# Patient Record
Sex: FEMALE | Race: WHITE | Employment: OTHER | URBAN - METROPOLITAN AREA
[De-identification: names, ages, dates, MRNs, and addresses within clinical notes are randomized per-mention and may not be internally consistent; named-entity substitution may affect disease eponyms.]

---

## 2019-04-25 LAB
ALBUMIN UR-MCNC: 30 MG/DL
APPEARANCE UR: ABNORMAL
BACTERIA #/AREA URNS HPF: ABNORMAL /HPF
BILIRUB UR QL STRIP: NEGATIVE
COLOR UR AUTO: YELLOW
GLUCOSE UR STRIP-MCNC: NEGATIVE MG/DL
HGB UR QL STRIP: ABNORMAL
KETONES UR STRIP-MCNC: NEGATIVE MG/DL
LEUKOCYTE ESTERASE UR QL STRIP: ABNORMAL
NITRATE UR QL: NEGATIVE
PH UR STRIP: 5.5 PH (ref 5–7)
RBC #/AREA URNS AUTO: 6 /HPF (ref 0–2)
SOURCE: ABNORMAL
SP GR UR STRIP: 1.01 (ref 1–1.03)
SQUAMOUS #/AREA URNS AUTO: 4 /HPF (ref 0–1)
UROBILINOGEN UR STRIP-MCNC: NORMAL MG/DL (ref 0–2)
WBC #/AREA URNS AUTO: >182 /HPF (ref 0–5)
WBC CLUMPS #/AREA URNS HPF: PRESENT /HPF

## 2019-04-25 PROCEDURE — 87088 URINE BACTERIA CULTURE: CPT | Performed by: FAMILY MEDICINE

## 2019-04-25 PROCEDURE — 81001 URINALYSIS AUTO W/SCOPE: CPT | Performed by: FAMILY MEDICINE

## 2019-04-25 PROCEDURE — 87086 URINE CULTURE/COLONY COUNT: CPT | Performed by: FAMILY MEDICINE

## 2019-04-25 PROCEDURE — 87186 SC STD MICRODIL/AGAR DIL: CPT | Performed by: FAMILY MEDICINE

## 2019-04-29 LAB
BACTERIA SPEC CULT: ABNORMAL
Lab: ABNORMAL
SPECIMEN SOURCE: ABNORMAL

## 2019-05-14 RX ORDER — LISINOPRIL 5 MG/1
5 TABLET ORAL DAILY
COMMUNITY
End: 2019-06-04

## 2019-05-14 RX ORDER — NITROGLYCERIN 0.4 MG/1
0.4 TABLET SUBLINGUAL EVERY 5 MIN PRN
COMMUNITY
End: 2019-09-06

## 2019-05-14 RX ORDER — FUROSEMIDE 20 MG
20 TABLET ORAL DAILY
COMMUNITY
End: 2019-06-04

## 2019-05-14 RX ORDER — ALLOPURINOL 300 MG/1
150 TABLET ORAL DAILY
COMMUNITY
End: 2019-06-04

## 2019-05-14 RX ORDER — PRAVASTATIN SODIUM 10 MG
10 TABLET ORAL DAILY
COMMUNITY
End: 2019-06-04

## 2019-05-14 RX ORDER — METOPROLOL SUCCINATE 25 MG/1
25 TABLET, EXTENDED RELEASE ORAL DAILY
COMMUNITY
End: 2019-06-04

## 2019-05-14 RX ORDER — CLOPIDOGREL BISULFATE 75 MG/1
75 TABLET ORAL DAILY
COMMUNITY
End: 2019-06-04

## 2019-05-14 RX ORDER — WARFARIN SODIUM 2.5 MG/1
TABLET ORAL DAILY
COMMUNITY

## 2019-05-14 RX ORDER — ACETAMINOPHEN 500 MG
1000 TABLET ORAL EVERY 6 HOURS PRN
COMMUNITY
End: 2019-07-26

## 2019-05-14 RX ORDER — DOCUSATE SODIUM 100 MG/1
100 CAPSULE, LIQUID FILLED ORAL 2 TIMES DAILY
COMMUNITY
End: 2019-06-04

## 2019-05-14 NOTE — PROGRESS NOTES
[facility]  PRIMARY CARE PROVIDER AND CLINIC:  Bridgett Delarosa, MIRZA CNP, [address] / [place] [address]  Chief Complaint   Patient presents with     Establish Care     [facility] Medical Record Number:  [id number]  Place of Service where encounter took place:  [facility] [[id number]]    Gladys Garrido  is a 89 year old  (4/24/1929), admitted to the above facility from  [facility]. Hospital stay 5/3/19 through 5/5/19..  Admitted to this facility for  rehab, medical management and nursing care.    HPI:    HPI information obtained from: facility chart records, facility staff, patient report, [facility] chart review and Care Everywhere [facility] chart review.     Brief Summary of Hospital Course:     This is a 89-year-old female, with a past medical history significant for chronic atrial fibrillation on anticoagulation, coronary artery disease s/p STEMI with aspiration thrombectomy and kissing balloon PTCA to distal RCA/RPDA on 3/21/19, takotsubo cardiomyopathy, polymyalgia rheumatic syndrome, chronic low back pain and chronic dysphagia, who was initially hospitalized at [facility] 3/21/19 through 3/27/19 for STEMI with inferior MI, RCA embolism thrombectomy and kissing balloon PTCA to RCA and RPDA on 3/21/19 . Thought to be secondary to subtherapeutic INR. An echocardiogram on 3/21/19 revealed EF of 35-40% with severe hypokinesis of the entire inferolateral wall extending to the apex and mid posterior wall. Mild-to-moderate aortic stenosis, severe TR and moderate pulmonary hypertension. Treated with diuretics for pulmonary edema and volume overload post-stenting. Metoprolol dose was decreased due to bradycardia. Noted to have possible aspiration after vomiting in cath lab. Treated with antibiotics x 24 hours as respiratory status improved with no signs of infection. Required 1:1 for metabolic encephalopathy  with visual hallucinations, confusion and agitation. Discharged to [facility].    Sent back to [facility] 4/4/19 through 4/5/19 for chest pain. Noted to have mild elevation in Troponin which may be residual from prior inferior STEMI. EF improved to 45-50%. Transferred back to [facility]. Completed Physical and Occupational Therapy. Discharged home on 4/17/19.     A urine culture was obtained on 4/25/19 with ,000 Citrobacter freundii complex. Received 1 dose of Macrobid, but then went to [facility] 5/3/19 through 5/5/19 for a fever. Diagnosed with sepsis related to a UTI, but urine culture revealed no growth. Treated with antibiotics. Discharged home on antibiotics.    Updates on Status Since Skilled nursing Admission:     Concerned about swelling in legs. Having trouble getting her shoes on. Has not taken her Lasix pill since 5/13/19 as her prescription is available at [facility], but her [place] and [place] to  the prescription. Notes that she dribbles more urine when she takes her Lasix medication. Having bowel movements with red pills she takes in yogurt. Lived at a different AL, but moved to [facility] as she was allergic to a smell. Previous PCP retired so is now having to establish care with a new provider. Pushes her wheelchair down to meals. Today would have been her 71 year anniversary with her  if he was still alive. 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 Did initially work outside of the home at [employer]. 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 Has 4 children, with a son in [place] living the furthest away. Has a daughter in [place], who travels for work, son in [place] and a son who lives up in [place]. Would prefer daughter or son in-law be called first.     Spoke to daughter, Nichole, who [place] the [place] and unable to  the  Lasix prescription for her mother. Has a meeting for work tomorrow, but could try to find someone else to  the prescription. Reports her mother has had edema in the legs for years. Improves when she is hospitalized or in a TCU and then comes back again.     CODE STATUS/ADVANCE DIRECTIVES DISCUSSION:   DNR / DNI  Patient's living condition: lives in an assisted living facility  ALLERGIES: Aspirin; Codeine; Penicillins; and Sulfa drugs  PAST MEDICAL HISTORY:  has no past medical history on file.  PAST SURGICAL HISTORY:   has no past surgical history on file.  FAMILY HISTORY: family history is not on file.  SOCIAL HISTORY:       Post Discharge Medication Reconciliation Status: discharge medications reconciled, continue medications without change    Current Outpatient Medications   Medication Sig Dispense Refill     acetaminophen (TYLENOL) 500 MG tablet Take 1,000 mg by mouth every 6 hours as needed for mild pain       allopurinol (ZYLOPRIM) 300 MG tablet Take 150 mg by mouth daily       clopidogrel (PLAVIX) 75 MG tablet Take 75 mg by mouth daily       docusate sodium (COLACE) 100 MG capsule Take 100 mg by mouth 2 times daily       furosemide (LASIX) 20 MG tablet Take 20 mg by mouth daily       lisinopril (PRINIVIL/ZESTRIL) 5 MG tablet Take 5 mg by mouth daily       metoprolol succinate ER (TOPROL-XL) 25 MG 24 hr tablet Take 25 mg by mouth daily       nitroGLYcerin (NITROSTAT) 0.4 MG sublingual tablet Place 0.4 mg under the tongue every 5 minutes as needed for chest pain For chest pain place 1 tablet under the tongue every 5 minutes for 3 doses. If symptoms persist 5 minutes after 1st dose call 911.       pravastatin (PRAVACHOL) 10 MG tablet Take 10 mg by mouth daily       warfarin (COUMADIN) 2.5 MG tablet Take by mouth daily 5 mg Tues/WedFri/Sun and 2.5 mg AOD       ROS:  10 point ROS of systems including Constitutional, Eyes, Respiratory, Cardiovascular, Gastroenterology, Genitourinary, Integumentary,  Musculoskeletal, Psychiatric were all negative except for pertinent positives noted in my HPI.    Vitals:  /57   Pulse 80   Temp 98  F (36.7  C)   Resp 16   Wt 69.9 kg (154 lb 3.2 oz)   Exam:  GENERAL APPEARANCE:  Alert, in no distress  ENT:  Mouth and posterior oropharynx normal, moist mucous membranes  EYES:  EOM, conjunctivae, lids, pupils and irises normal  RESP:  respiratory effort and palpation of chest normal, lungs clear to auscultation , no respiratory distress  CV:  Palpation and auscultation of heart done , regular rate and rhythm, no murmur, rub, or gallop  ABDOMEN:  normal bowel sounds, soft, nontender, no hepatosplenomegaly or other masses  M/S:   Active movement of bilateral upper and lower extremities. 1+ edema in BLE.  SKIN:  Inspection of skin and subcutaneous tissue baseline, Palpation of skin and subcutaneous tissue baseline  NEURO:   Cranial nerves 2-12 are normal tested and grossly at patient's baseline  PSYCH:  affect and mood normal    Lab/Diagnostic data:  Labs done in SNF are in [facility]. Please refer to them using [employer]/Care Everywhere.    ASSESSMENT/PLAN:  Coronary Artery Disease/STEMI S/P PTCA to Distal RCA/PDA/BREDNA Trifurcation/Hypertension. Last seen by Laurel Rasmussen NP, on 4/24/19. Instructed to follow-up with Dr. Emily Griggs in 3 months. Continue Clopidogrel x 1 year from heart attack. Monitor vital signs monthly. Continue Lisinopril, Metoprolol, Nitroglycerin and Pravastatin as ordered. Receiving Home Health RN through [facility]. Blood pressure low today, 106/57. Will have  Nurse follow-up with blood pressures to determine if further adjustments need to be made.    Ischemic Cardiomyopathy with EF 45-50% on 4/24/19. Has not taken Furosemide in 3 days due to being unable to get to pharmacy to  prescription. Discussed with nursing at facility who report unable to order prescription through [facility] pharmacy, [place] pharmacy, until July as prescription was  recently filled at [facility]. Home Health Nurse canceled prescription at [facility] so it was able to be filled at [facility] pharmacy. Weight 154.2 lbs today with no other recent weights available to review. Has BLE edema, but no complaints of shortness of breath at rest. Weight 145 lbs at hospital discharge 5/5/19. Continue to monitor weights. If continued upward trend, despite now receiving daily Furosemide, will need to increase Furosemide and/or update Cardiology. HH RN to be out to see patient tomorrow.    Chronic Atrial Fibrillation on Anticoagulation. Next INR due tomorrow. Takes Warfarin 5 mg on T, W, F, Sun and 2.5 mg all other days. Following today's visit, received report from nursing staff of medication error that patient received 7.5 mg daily x 2 days. Monitor for bleed closely. Remains on Metoprolol.     Chronic Bilateral Lower Extremity Edema. Takes Furosemide. Encourage elevation. May also benefit from compression stockings.    Polymyalgia Rheumatica. Previously on Prednisone, but this caused weakness and intermittent visual hallucinations, vivid dreams and intermittent indigestion. Son reported to previous provider acute PMR when sed rate 40. Will need to clarify when Prednisone was stopped. Not on most recent hospitalization admission medication list.     Chronic Low Back Pain. No reports of pain during today's visit. Has Acetaminophen available as needed.    Gout. Continue Allopurinol as ordered. Uncertain of last gout flare. Consider GDR if no recent gout flare.     Anemia. Hemoglobin 12.3 on 3/21/19 -> 10.6 on 5/4/19. Will repeat CBC to ensure stability.     Total time spent with patient visit was 60 minutes including patient visit, review of past records and phone call to patient contact. Greater than 50% of total time spent with counseling and coordinating care due to review of past medical history, review of most recent hospitalizations, discussion with patient, phone call to daughter and  discussion with staff regarding Furosemide and weight.     Electronically signed by:  MIRZA Kwan CNP

## 2019-05-15 ENCOUNTER — ASSISTED LIVING VISIT (OUTPATIENT)
Dept: GERIATRICS | Facility: CLINIC | Age: 84
End: 2019-05-15
Payer: MEDICARE

## 2019-05-15 VITALS
WEIGHT: 154.2 LBS | RESPIRATION RATE: 16 BRPM | DIASTOLIC BLOOD PRESSURE: 57 MMHG | HEART RATE: 80 BPM | TEMPERATURE: 98 F | SYSTOLIC BLOOD PRESSURE: 106 MMHG

## 2019-05-15 DIAGNOSIS — I25.5 ISCHEMIC CARDIOMYOPATHY: ICD-10-CM

## 2019-05-15 DIAGNOSIS — I25.119 CORONARY ARTERY DISEASE INVOLVING NATIVE HEART WITH ANGINA PECTORIS, UNSPECIFIED VESSEL OR LESION TYPE (H): Primary | ICD-10-CM

## 2019-05-15 DIAGNOSIS — M35.3 POLYMYALGIA RHEUMATICA (H): ICD-10-CM

## 2019-05-15 DIAGNOSIS — G89.29 CHRONIC LOW BACK PAIN, UNSPECIFIED BACK PAIN LATERALITY, WITH SCIATICA PRESENCE UNSPECIFIED: ICD-10-CM

## 2019-05-15 DIAGNOSIS — M1A.9XX0 CHRONIC GOUT WITHOUT TOPHUS, UNSPECIFIED CAUSE, UNSPECIFIED SITE: ICD-10-CM

## 2019-05-15 DIAGNOSIS — I48.20 CHRONIC ATRIAL FIBRILLATION (H): ICD-10-CM

## 2019-05-15 DIAGNOSIS — M54.5 CHRONIC LOW BACK PAIN, UNSPECIFIED BACK PAIN LATERALITY, WITH SCIATICA PRESENCE UNSPECIFIED: ICD-10-CM

## 2019-05-15 DIAGNOSIS — D64.9 ANEMIA, UNSPECIFIED TYPE: ICD-10-CM

## 2019-05-15 DIAGNOSIS — R60.0 LOCALIZED EDEMA: ICD-10-CM

## 2019-05-15 NOTE — LETTER
5/15/2019        RE: Gladys Garrido  [facility]  [address]  [facility] [address]        [facility]  PRIMARY CARE PROVIDER AND CLINIC:  MIRZA Kwan CNP, [address] / [place] [address]  Chief Complaint   Patient presents with     Establish Care     [facility] Medical Record Number:  [id number]  Place of Service where encounter took place:  [facility] [[id number]]    Gladys Garrido  is a 89 year old  (4/24/1929), admitted to the above facility from  [facility]. Hospital stay 5/3/19 through 5/5/19..  Admitted to this facility for  rehab, medical management and nursing care.    HPI:    HPI information obtained from: facility chart records, facility staff, patient report, [facility] chart review and Care Everywhere [facility] chart review.     Brief Summary of Hospital Course:     This is a 89-year-old female, with a past medical history significant for chronic atrial fibrillation on anticoagulation, coronary artery disease s/p STEMI with aspiration thrombectomy and kissing balloon PTCA to distal RCA/RPDA on 3/21/19, takotsubo cardiomyopathy, polymyalgia rheumatic syndrome, chronic low back pain and chronic dysphagia, who was initially hospitalized at [facility] 3/21/19 through 3/27/19 for STEMI with inferior MI, RCA embolism thrombectomy and kissing balloon PTCA to RCA and RPDA on 3/21/19 . Thought to be secondary to subtherapeutic INR. An echocardiogram on 3/21/19 revealed EF of 35-40% with severe hypokinesis of the entire inferolateral wall extending to the apex and mid posterior wall. Mild-to-moderate aortic stenosis, severe TR and moderate pulmonary hypertension. Treated with diuretics for pulmonary edema and volume overload post-stenting. Metoprolol dose was decreased due to bradycardia. Noted to have possible aspiration after vomiting in cath lab. Treated with antibiotics x 24 hours  as respiratory status improved with no signs of infection. Required 1:1 for metabolic encephalopathy with visual hallucinations, confusion and agitation. Discharged to [facility].    Sent back to [facility] 4/4/19 through 4/5/19 for chest pain. Noted to have mild elevation in Troponin which may be residual from prior inferior STEMI. EF improved to 45-50%. Transferred back to [facility]. Completed Physical and Occupational Therapy. Discharged home on 4/17/19.     A urine culture was obtained on 4/25/19 with ,000 Citrobacter freundii complex. Received 1 dose of Macrobid, but then went to [facility] 5/3/19 through 5/5/19 for a fever. Diagnosed with sepsis related to a UTI, but urine culture revealed no growth. Treated with antibiotics. Discharged home on antibiotics.    Updates on Status Since Skilled nursing Admission:     Concerned about swelling in legs. Having trouble getting her shoes on. Has not taken her Lasix pill since 5/13/19 as her prescription is available at [facility], but her daughter is out of town [place] the [place] and unable to  the prescription. Notes that she dribbles more urine when she takes her Lasix medication. Having bowel movements with red pills she takes in yogurt. Lived at a different AL, but moved to [facility] as she was allergic to a smell. Previous PCP retired so is now having to establish care with a new provider. Pushes her wheelchair down to meals. Today would have been her 71 year anniversary with her  if he was still alive. 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 Did initially work outside of the home at [employer]. 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 Has 4 children, with a son in [place] living the furthest away. Has a daughter in [place], who travels for work, son in [place] and a son who lives up in [place]. Would prefer daughter or son in-law be  called first.     Spoke to daughter, Nichole, who [place] and unable to  the Lasix prescription for her mother. Has a meeting for work tomorrow, but could try to find someone else to  the prescription. Reports her mother has had edema in the legs for years. Improves when she is hospitalized or in a TCU and then comes back again.     CODE STATUS/ADVANCE DIRECTIVES DISCUSSION:   DNR / DNI  Patient's living condition: lives in an assisted living facility  ALLERGIES: Aspirin; Codeine; Penicillins; and Sulfa drugs  PAST MEDICAL HISTORY:  has no past medical history on file.  PAST SURGICAL HISTORY:   has no past surgical history on file.  FAMILY HISTORY: family history is not on file.  SOCIAL HISTORY:       Post Discharge Medication Reconciliation Status: discharge medications reconciled, continue medications without change    Current Outpatient Medications   Medication Sig Dispense Refill     acetaminophen (TYLENOL) 500 MG tablet Take 1,000 mg by mouth every 6 hours as needed for mild pain       allopurinol (ZYLOPRIM) 300 MG tablet Take 150 mg by mouth daily       clopidogrel (PLAVIX) 75 MG tablet Take 75 mg by mouth daily       docusate sodium (COLACE) 100 MG capsule Take 100 mg by mouth 2 times daily       furosemide (LASIX) 20 MG tablet Take 20 mg by mouth daily       lisinopril (PRINIVIL/ZESTRIL) 5 MG tablet Take 5 mg by mouth daily       metoprolol succinate ER (TOPROL-XL) 25 MG 24 hr tablet Take 25 mg by mouth daily       nitroGLYcerin (NITROSTAT) 0.4 MG sublingual tablet Place 0.4 mg under the tongue every 5 minutes as needed for chest pain For chest pain place 1 tablet under the tongue every 5 minutes for 3 doses. If symptoms persist 5 minutes after 1st dose call 911.       pravastatin (PRAVACHOL) 10 MG tablet Take 10 mg by mouth daily       warfarin (COUMADIN) 2.5 MG tablet Take by mouth daily 5 mg Tues/WedFri/Sun and 2.5 mg AOD       ROS:  10 point ROS of systems including  Constitutional, Eyes, Respiratory, Cardiovascular, Gastroenterology, Genitourinary, Integumentary, Musculoskeletal, Psychiatric were all negative except for pertinent positives noted in my HPI.    Vitals:  /57   Pulse 80   Temp 98  F (36.7  C)   Resp 16   Wt 69.9 kg (154 lb 3.2 oz)   Exam:  GENERAL APPEARANCE:  Alert, in no distress  ENT:  Mouth and posterior oropharynx normal, moist mucous membranes  EYES:  EOM, conjunctivae, lids, pupils and irises normal  RESP:  respiratory effort and palpation of chest normal, lungs clear to auscultation , no respiratory distress  CV:  Palpation and auscultation of heart done , regular rate and rhythm, no murmur, rub, or gallop  ABDOMEN:  normal bowel sounds, soft, nontender, no hepatosplenomegaly or other masses  M/S:   Active movement of bilateral upper and lower extremities. 1+ edema in BLE.  SKIN:  Inspection of skin and subcutaneous tissue baseline, Palpation of skin and subcutaneous tissue baseline  NEURO:   Cranial nerves 2-12 are normal tested and grossly at patient's baseline  PSYCH:  affect and mood normal    Lab/Diagnostic data:  Labs done in SNF are in [facility]. Please refer to them using [employer]/Care Everywhere.    ASSESSMENT/PLAN:  Coronary Artery Disease/STEMI S/P PTCA to Distal RCA/PDA/BRENDA Trifurcation/Hypertension. Last seen by Laurel Rasmussen NP, on 4/24/19. Instructed to follow-up with Dr. Emily Griggs in 3 months. Continue Clopidogrel x 1 year from heart attack. Monitor vital signs monthly. Continue Lisinopril, Metoprolol, Nitroglycerin and Pravastatin as ordered. Receiving Home Health RN through [facility]. Blood pressure low today, 106/57. Will have  Nurse follow-up with blood pressures to determine if further adjustments need to be made.    Ischemic Cardiomyopathy with EF 45-50% on 4/24/19. Has not taken Furosemide in 3 days due to being unable to get to pharmacy to  prescription. Discussed with nursing at facility who report unable to  order prescription through [facility] pharmacy, [place] pharmacy, until July as prescription was recently filled at [facility]. Home Health Nurse canceled prescription at [facility] so it was able to be filled at [facility] pharmacy. Weight 154.2 lbs today with no other recent weights available to review. Has BLE edema, but no complaints of shortness of breath at rest. Weight 145 lbs at hospital discharge 5/5/19. Continue to monitor weights. If continued upward trend, despite now receiving daily Furosemide, will need to increase Furosemide and/or update Cardiology. HH RN to be out to see patient tomorrow.    Chronic Atrial Fibrillation on Anticoagulation. Next INR due tomorrow. Takes Warfarin 5 mg on T, W, F, Sun and 2.5 mg all other days. Following today's visit, received report from nursing staff of medication error that patient received 7.5 mg daily x 2 days. Monitor for bleed closely. Remains on Metoprolol.     Chronic Bilateral Lower Extremity Edema. Takes Furosemide. Encourage elevation. May also benefit from compression stockings.    Polymyalgia Rheumatica. Previously on Prednisone, but this caused weakness and intermittent visual hallucinations, vivid dreams and intermittent indigestion. Son reported to previous provider acute PMR when sed rate 40. Will need to clarify when Prednisone was stopped. Not on most recent hospitalization admission medication list.     Chronic Low Back Pain. No reports of pain during today's visit. Has Acetaminophen available as needed.    Gout. Continue Allopurinol as ordered. Uncertain of last gout flare. Consider GDR if no recent gout flare.     Anemia. Hemoglobin 12.3 on 3/21/19 -> 10.6 on 5/4/19. Will repeat CBC to ensure stability.     Total time spent with patient visit was 60 minutes including patient visit, review of past records and phone call to patient contact. Greater than 50% of total time spent with counseling and coordinating care due to review of past medical history,  review of most recent hospitalizations, discussion with patient, phone call to daughter and discussion with staff regarding Furosemide and weight.     Electronically signed by:  MIRZA Kwan CNP                       Sincerely,        MIRZA Kwan CNP

## 2019-05-20 ENCOUNTER — RECORDS - HEALTHEAST (OUTPATIENT)
Dept: LAB | Facility: CLINIC | Age: 84
End: 2019-05-20

## 2019-05-21 LAB — BACTERIA SPEC CULT: NO GROWTH

## 2019-05-23 ENCOUNTER — MEDICAL CORRESPONDENCE (OUTPATIENT)
Dept: HEALTH INFORMATION MANAGEMENT | Facility: CLINIC | Age: 84
End: 2019-05-23

## 2019-05-23 LAB
ANION GAP SERPL CALCULATED.3IONS-SCNC: 7 MMOL/L (ref 3–14)
BUN SERPL-MCNC: 25 MG/DL (ref 7–30)
CALCIUM SERPL-MCNC: 9.1 MG/DL (ref 8.5–10.1)
CHLORIDE SERPL-SCNC: 109 MMOL/L (ref 94–109)
CO2 SERPL-SCNC: 25 MMOL/L (ref 20–32)
CREAT SERPL-MCNC: 0.65 MG/DL (ref 0.52–1.04)
ERYTHROCYTE [DISTWIDTH] IN BLOOD BY AUTOMATED COUNT: 15.1 % (ref 10–15)
GFR SERPL CREATININE-BSD FRML MDRD: 78 ML/MIN/{1.73_M2}
GLUCOSE SERPL-MCNC: 115 MG/DL (ref 70–99)
HCT VFR BLD AUTO: 31.2 % (ref 35–47)
HGB BLD-MCNC: 10.3 G/DL (ref 11.7–15.7)
MCH RBC QN AUTO: 30.7 PG (ref 26.5–33)
MCHC RBC AUTO-ENTMCNC: 33 G/DL (ref 31.5–36.5)
MCV RBC AUTO: 93 FL (ref 78–100)
PLATELET # BLD AUTO: 214 10E9/L (ref 150–450)
POTASSIUM SERPL-SCNC: 3.3 MMOL/L (ref 3.4–5.3)
RBC # BLD AUTO: 3.36 10E12/L (ref 3.8–5.2)
SODIUM SERPL-SCNC: 141 MMOL/L (ref 133–144)
WBC # BLD AUTO: 3.5 10E9/L (ref 4–11)

## 2019-05-23 PROCEDURE — 85027 COMPLETE CBC AUTOMATED: CPT | Performed by: INTERNAL MEDICINE

## 2019-05-23 PROCEDURE — 80048 BASIC METABOLIC PNL TOTAL CA: CPT | Performed by: INTERNAL MEDICINE

## 2019-05-28 VITALS
DIASTOLIC BLOOD PRESSURE: 57 MMHG | TEMPERATURE: 98 F | WEIGHT: 147 LBS | SYSTOLIC BLOOD PRESSURE: 106 MMHG | RESPIRATION RATE: 16 BRPM | HEART RATE: 80 BPM

## 2019-05-28 NOTE — PROGRESS NOTES
[place] [facility]  [facility] Record Number:  [id number]  Place of Service where encounter took place:  [facility] [[id number]]  Chief Complaint   Patient presents with     RECHECK     HPI:    Gladys Garrido  is a 89 year old (4/24/1929), who is being seen today for an episodic care visit.  HPI information obtained from: facility chart records, facility staff, patient report, [facility] chart review and Care Everywhere [facility] chart review.    Today's concern is:    Ischemic Cardiomyopathy with EF 45-50% on 4/24/19. On 5/15/19 visit, patient had not received Furosemide for 3 days prior due to inability to obtain prescription at pharmacy. Re-initiated Furosemide 20 mg daily on 5/15/19 after it was re-ordered through [place] [facility]. On 5/20/19, [facility] RN noted patient to have increased swelling in bilateral legs as well as an increase in weight with an overall sense of not feeling well. Ordered increase in Furosemide 20 mg PO BID x 3 days. On 5/23/19, noted to be feeling better by [facility] RN. Today, patient is visibly short of breath walking to the door from her chair in the living room, but denies shortness of breath. Reports feeling dizzy upon standing up. Is concerned as she is dribbling urine. Had a more steady stream when her water pill was increased. Has also noted her legs are heavy. Denies chest pain.     Hypertension. Limited blood pressures available to review, 106/57 on 5/15/19.  CHRISTEL RN has been obtaining blood pressures at visits, but those are unable to be reviewed at this time. Today, patient complains of dizziness upon standing. Resolved when sitting down.    Polymyalgia Rheumatica. Complains of right shoulder pain. States the last time she had this pain, her PMR was flaring up. Had to be put on Prednisone. Would like her rate checked to see if she needs to be put on medication. Does not want to go out to see a Rheumatologist if  indicated. States pain is managed with Tylenol. Would like Tylenol available in her room as she likes to be independent.    Tearfulness. During today's visit, is tearful discussing how her children are all busy with their lives. States her daughter is frequently out of town for work or working on her cabin. Her physician son will be out of town until next month. Most supportive son lives in [place]. Also mentions how her  was unable to see their 70th wedding anniversary. Her  really wanted to make it to that wedding anniversary. Wants to know why she hasn't seen a doctor or why the Cardiologist cannot come to the facility. Wanted to go to a summer gathering today, but does not feel well enough to attend. Reports not sleeping well last night.    Hypokalemia. Potassium 3.3 on 5/23/19. Potassium Chloride 20 mEq ordered on 5/28/19. No reports of palpitations    Vaginal Itchiness. Complains of feeling itchy down there. Used soap to wash that she received in the hospital. Had not used that soap before except when she was in the hospital earlier this month. Also noted a bright red smear of blood while wiping 1 time. Has not had any other discharge. Feels itchiness is on the inside. Has been dribbling when she urinates. Wears a pad in her underwear.     Past Medical and Surgical History reviewed in Epic today.    MEDICATIONS:  Current Outpatient Medications   Medication Sig Dispense Refill     acetaminophen (TYLENOL) 500 MG tablet Take 1,000 mg by mouth every 6 hours as needed for mild pain       allopurinol (ZYLOPRIM) 300 MG tablet Take 150 mg by mouth daily       clopidogrel (PLAVIX) 75 MG tablet Take 75 mg by mouth daily       docusate sodium (COLACE) 100 MG capsule Take 100 mg by mouth 2 times daily       furosemide (LASIX) 20 MG tablet Take 20 mg by mouth daily       lisinopril (PRINIVIL/ZESTRIL) 5 MG tablet Take 5 mg by mouth daily       metoprolol succinate ER (TOPROL-XL) 25 MG 24 hr tablet Take 25  mg by mouth daily       nitroGLYcerin (NITROSTAT) 0.4 MG sublingual tablet Place 0.4 mg under the tongue every 5 minutes as needed for chest pain For chest pain place 1 tablet under the tongue every 5 minutes for 3 doses. If symptoms persist 5 minutes after 1st dose call 911.       pravastatin (PRAVACHOL) 10 MG tablet Take 10 mg by mouth daily       warfarin (COUMADIN) 2.5 MG tablet Take by mouth daily 5 mg Tues/WedFri/Sun and 2.5 mg AOD       REVIEW OF SYSTEMS:  4 point ROS including Respiratory, CV, GI and , other than that noted in the HPI,  is negative    Objective:  /57   Pulse 80   Temp 98  F (36.7  C)   Resp 16   Wt 66.7 kg (147 lb)   Exam:  GENERAL APPEARANCE:  Alert, in no distress  ENT:  Mouth and posterior oropharynx normal, moist mucous membranes  EYES:  EOM, conjunctivae, lids, pupils and irises normal  RESP:  respiratory effort and palpation of chest normal, lungs clear to auscultation , no respiratory distress  CV:  Palpation and auscultation of heart done , regular rate and rhythm, no murmur, rub, or gallop  ABDOMEN:  normal bowel sounds, soft, nontender, no hepatosplenomegaly or other masses  M/S:   Active movement of bilateral upper and lower extremities. 1+ edema in BLE.  SKIN: Labia majora with no obvious lesions or redness  NEURO:   Cranial nerves 2-12 are normal tested and grossly at patient's baseline  PSYCH:  Tearful at times     Labs:   Labs done in SNF are in [facility]. Please refer to them using [facility]/Care Everywhere.    ASSESSMENT/PLAN:  Ischemic Cardiomyopathy with EF 45-50% on 4/24/19. No weight available to review from today's visit. Is visibly short of breath with mobility with EDUARD KEARNEY RN reports is new. Edema appears to be at baseline. Has refused lymphedema treatment offered by EDUARD KEARNEY RN. Given shortness of breath with mobility and overall sense of not feeling well, will increase Furosemide to 20 mg BID today. EDUARD KEARNEY RN to visit patient tomorrow with update on weight and  blood pressure. Contacted Laurel Rasmussen NP at [facility] to determine if she would like to manage patient's medication adjustments as patient was not clear how she wanted things managed and did not want daughter contacted. Cardiology support staff report PCP often manage adjustments in medication as they can see patient sooner.     Hypertension. With reports of dizziness noted during today's visit. Single blood pressure reading on 5/15/19 soft. Request [facility] RN obtain blood pressure during tomorrow's visit. May require adjustment in Lisinopril and/or Metoprolol.     Polymyalgia Rheumatica. Ordered repeat Sed Rate per patient request. Encouraged to ask staff if needing Acetaminophen for pain. Previously on Prednisone, but this caused weakness and intermittent visual hallucinations, vivid dreams and intermittent indigestion. Son reported to previous provider acute PMR when sed rate 40    Tearfulness. Noted to be depressed during 18 visit with PCP related to the loss of her . As this is only 2nd interaction with patient, question if this is baseline. Will continue to monitor mood to determine if further intervention is warranted.    Hypokalemia. Likely secondary to increase in Furosemide as noted above. Supplemented with Potassium Chloride 20 mEq PO x 1 on 19. Repeat BMP on next lab day to ensure stability.     Vaginal Itching. No obvious issues noted on labia. No speculum available to examine further. Will order Fluconazole 150 mg PO x 1 today. If no relief, could consider vaginal atrophy.     Total time with an established patient visit in the assisted livin minutes including discussions about the POC and care coordination with the patient. Greater than 50% of total time spent with counseling and coordinating care due to discussion with patient, discussion with [facility] RN, calling Cardiologist and review past medical records     Electronically signed  by:  MIRZA Kwan CNP

## 2019-05-29 ENCOUNTER — ASSISTED LIVING VISIT (OUTPATIENT)
Dept: GERIATRICS | Facility: CLINIC | Age: 84
End: 2019-05-29
Payer: MEDICARE

## 2019-05-29 DIAGNOSIS — N89.8 VAGINAL ITCHING: ICD-10-CM

## 2019-05-29 DIAGNOSIS — I10 BENIGN ESSENTIAL HYPERTENSION: ICD-10-CM

## 2019-05-29 DIAGNOSIS — E87.6 HYPOKALEMIA: ICD-10-CM

## 2019-05-29 DIAGNOSIS — I25.5 ISCHEMIC CARDIOMYOPATHY: Primary | ICD-10-CM

## 2019-05-29 DIAGNOSIS — M35.3 POLYMYALGIA RHEUMATICA (H): ICD-10-CM

## 2019-05-29 DIAGNOSIS — R45.89 TEARFULNESS: ICD-10-CM

## 2019-05-29 NOTE — LETTER
5/29/2019        RE: Gladys Garrido  [facility]  [address]  [facility] [address]        [place] [facility]  [place] [facility] Record Number:  [id number]  Place of Service where encounter took place:  [facility] ([facility] [[id number]]  Chief Complaint   Patient presents with     RECHECK     HPI:    Gladys Garrido  is a 89 year old (4/24/1929), who is being seen today for an episodic care visit.  HPI information obtained from: facility chart records, facility staff, patient report, [facility] chart review and Care Everywhere [facility] chart review.    Today's concern is:    Ischemic Cardiomyopathy with EF 45-50% on 4/24/19. On 5/15/19 visit, patient had not received Furosemide for 3 days prior due to inability to obtain prescription at pharmacy. Re-initiated Furosemide 20 mg daily on 5/15/19 after it was re-ordered through [place]. On 5/20/19, [facility] RN noted patient to have increased swelling in bilateral legs as well as an increase in weight with an overall sense of not feeling well. Ordered increase in Furosemide 20 mg PO BID x 3 days. On 5/23/19, noted to be feeling better by [facility] RN. Today, patient is visibly short of breath walking to the door from her chair in the living room, but denies shortness of breath. Reports feeling dizzy upon standing up. Is concerned as she is dribbling urine. Had a more steady stream when her water pill was increased. Has also noted her legs are heavy. Denies chest pain.     Hypertension. Limited blood pressures available to review, 106/57 on 5/15/19.  CHRISTEL RN has been obtaining blood pressures at visits, but those are unable to be reviewed at this time. Today, patient complains of dizziness upon standing. Resolved when sitting down.    Polymyalgia Rheumatica. Complains of right shoulder pain. States the last time she had this pain, her PMR was flaring up. Had to be put on Prednisone. Would like her rate checked  to see if she needs to be put on medication. Does not want to go out to see a Rheumatologist if indicated. States pain is managed with Tylenol. Would like Tylenol available in her room as she likes to be independent.    Tearfulness. During today's visit, is tearful discussing how her children are all busy with their lives. States her daughter is frequently out of town for work or working on her cabin. Her physician son will be out of town until next month. Most supportive son lives in [place]. Also mentions how her  was unable to see their 70th wedding anniversary. Her  really wanted to make it to that wedding anniversary. Wants to know why she hasn't seen a doctor or why the Cardiologist cannot come to the facility. Wanted to go to a summer gathering today, but does not feel well enough to attend. Reports not sleeping well last night.    Hypokalemia. Potassium 3.3 on 5/23/19. Potassium Chloride 20 mEq ordered on 5/28/19. No reports of palpitations    Vaginal Itchiness. Complains of feeling itchy down there. Used soap to wash that she received in the hospital. Had not used that soap before except when she was in the hospital earlier this month. Also noted a bright red smear of blood while wiping 1 time. Has not had any other discharge. Feels itchiness is on the inside. Has been dribbling when she urinates. Wears a pad in her underwear.     Past Medical and Surgical History reviewed in Epic today.    MEDICATIONS:  Current Outpatient Medications   Medication Sig Dispense Refill     acetaminophen (TYLENOL) 500 MG tablet Take 1,000 mg by mouth every 6 hours as needed for mild pain       allopurinol (ZYLOPRIM) 300 MG tablet Take 150 mg by mouth daily       clopidogrel (PLAVIX) 75 MG tablet Take 75 mg by mouth daily       docusate sodium (COLACE) 100 MG capsule Take 100 mg by mouth 2 times daily       furosemide (LASIX) 20 MG tablet Take 20 mg by mouth daily       lisinopril (PRINIVIL/ZESTRIL) 5 MG tablet  Take 5 mg by mouth daily       metoprolol succinate ER (TOPROL-XL) 25 MG 24 hr tablet Take 25 mg by mouth daily       nitroGLYcerin (NITROSTAT) 0.4 MG sublingual tablet Place 0.4 mg under the tongue every 5 minutes as needed for chest pain For chest pain place 1 tablet under the tongue every 5 minutes for 3 doses. If symptoms persist 5 minutes after 1st dose call 911.       pravastatin (PRAVACHOL) 10 MG tablet Take 10 mg by mouth daily       warfarin (COUMADIN) 2.5 MG tablet Take by mouth daily 5 mg Tues/WedFri/Sun and 2.5 mg AOD       REVIEW OF SYSTEMS:  4 point ROS including Respiratory, CV, GI and , other than that noted in the HPI,  is negative    Objective:  /57   Pulse 80   Temp 98  F (36.7  C)   Resp 16   Wt 66.7 kg (147 lb)   Exam:  GENERAL APPEARANCE:  Alert, in no distress  ENT:  Mouth and posterior oropharynx normal, moist mucous membranes  EYES:  EOM, conjunctivae, lids, pupils and irises normal  RESP:  respiratory effort and palpation of chest normal, lungs clear to auscultation , no respiratory distress  CV:  Palpation and auscultation of heart done , regular rate and rhythm, no murmur, rub, or gallop  ABDOMEN:  normal bowel sounds, soft, nontender, no hepatosplenomegaly or other masses  M/S:   Active movement of bilateral upper and lower extremities. 1+ edema in BLE.  SKIN:  Labia majora with no obvious lesions or redness  NEURO:   Cranial nerves 2-12 are normal tested and grossly at patient's baseline  PSYCH:   Tearful at times     Labs:   Labs done in SNF are in [facility]. Please refer to them using [employer]/Care Everywhere.    ASSESSMENT/PLAN:  Ischemic Cardiomyopathy with EF 45-50% on 4/24/19. No weight available to review from today's visit. Is visibly short of breath with mobility with EDUARD KEARNEY RN reports is new. Edema appears to be at baseline. Has refused lymphedema treatment offered by EDUARD KEARNEY RN. Given shortness of breath with mobility and overall sense of not feeling well, will  increase Furosemide to 20 mg BID today. EDUARD KEARNEY RN to visit patient tomorrow with update on weight and blood pressure. Contacted Laurel Rasmussen NP at [facility] to determine if she would like to manage patient's medication adjustments as patient was not clear how she wanted things managed and did not want daughter contacted. Cardiology support staff report PCP often manage adjustments in medication as they can see patient sooner.     Hypertension. With reports of dizziness noted during today's visit. Single blood pressure reading on 5/15/19 soft. Request EDUARD KEARNEY RN obtain blood pressure during tomorrow's visit. May require adjustment in Lisinopril and/or Metoprolol.     Polymyalgia Rheumatica. Ordered repeat Sed Rate per patient request. Encouraged to ask staff if needing Acetaminophen for pain. Previously on Prednisone, but this caused weakness and intermittent visual hallucinations, vivid dreams and intermittent indigestion. Son reported to previous provider acute PMR when sed rate 40    Tearfulness. Noted to be depressed during 18 visit with PCP related to the loss of her . As this is only 2nd interaction with patient, question if this is baseline. Will continue to monitor mood to determine if further intervention is warranted.    Hypokalemia. Likely secondary to increase in Furosemide as noted above. Supplemented with Potassium Chloride 20 mEq PO x 1 on 19. Repeat BMP on next lab day to ensure stability.     Vaginal Itching. No obvious issues noted on labia. No speculum available to examine further. Will order Fluconazole 150 mg PO x 1 today. If no relief, could consider vaginal atrophy.     Total time with an established patient visit in the assisted livin minutes including discussions about the POC and care coordination with the patient. Greater than 50% of total time spent with counseling and coordinating care due to discussion with patient, discussion with [facility], calling Cardiologist and review past medical records     Electronically signed by:  MIRZA Kwan CNP             Sincerely,        MIRZA Kwan CNP

## 2019-05-30 LAB
ANION GAP SERPL CALCULATED.3IONS-SCNC: 10 MMOL/L (ref 3–14)
BUN SERPL-MCNC: 21 MG/DL (ref 7–30)
CALCIUM SERPL-MCNC: 8.6 MG/DL (ref 8.5–10.1)
CHLORIDE SERPL-SCNC: 111 MMOL/L (ref 94–109)
CO2 SERPL-SCNC: 16 MMOL/L (ref 20–32)
CREAT SERPL-MCNC: 0.57 MG/DL (ref 0.52–1.04)
ERYTHROCYTE [SEDIMENTATION RATE] IN BLOOD BY WESTERGREN METHOD: 61 MM/H (ref 0–30)
GFR SERPL CREATININE-BSD FRML MDRD: 82 ML/MIN/{1.73_M2}
GLUCOSE SERPL-MCNC: 99 MG/DL (ref 70–99)
IRON SATN MFR SERPL: 14 % (ref 15–46)
IRON SERPL-MCNC: 49 UG/DL (ref 35–180)
POTASSIUM SERPL-SCNC: 4.5 MMOL/L (ref 3.4–5.3)
SODIUM SERPL-SCNC: 137 MMOL/L (ref 133–144)
TIBC SERPL-MCNC: 353 UG/DL (ref 240–430)

## 2019-05-30 PROCEDURE — 83550 IRON BINDING TEST: CPT | Performed by: INTERNAL MEDICINE

## 2019-05-30 PROCEDURE — 85652 RBC SED RATE AUTOMATED: CPT | Performed by: INTERNAL MEDICINE

## 2019-05-30 PROCEDURE — 83540 ASSAY OF IRON: CPT | Performed by: INTERNAL MEDICINE

## 2019-05-30 PROCEDURE — 80048 BASIC METABOLIC PNL TOTAL CA: CPT | Performed by: INTERNAL MEDICINE

## 2019-05-31 ENCOUNTER — TELEPHONE (OUTPATIENT)
Dept: GERIATRICS | Facility: CLINIC | Age: 84
End: 2019-05-31

## 2019-05-31 DIAGNOSIS — M35.3 PMR (POLYMYALGIA RHEUMATICA) (H): Primary | ICD-10-CM

## 2019-05-31 RX ORDER — PREDNISONE 10 MG/1
10 TABLET ORAL DAILY
Start: 2019-05-31 | End: 2019-07-02

## 2019-06-01 NOTE — TELEPHONE ENCOUNTER
Reviewed lab results from 5/30/19 with Sed Rate of 61. Contacted RN at [facility] as phone number listed for patient in facility EHR is incorrect. Spoke to patient regarding lab results, as patient's son had reported to previous provider a Sed Rate > 40 indicates a flare. Today, patient reports her right and left shoulders are painful and difficult to move. Also having neck pain. Has had neck surgery in the past. Does not recall having a reaction to Prednisone in the past. Has taken for many years with improvement in PMR symptoms. Also requests to have Tylenol available in her room to take for pain.    Upon review of previous documentation, complained of right shoulder pain and increased weakness. Sed rate elevated at 56 on 4/26/18 at PCP. Re-started on Prednisone 10 mg in late July 2018. Seen by NP on 9/4/18 and complained of increased weakness with intermittent visual hallucinations, vivid dreams and intermittent indigestion. Also noted to be grieving from the loss of her  in January 2018. Prednisone was decreased to 5 mg as it was thought this may be contributing.     Per patient report, does not remember reacting to Prednisone. Would like to try using Prednisone again. Does not want family to be called. Is not interested in being seen by [facility] Alyssa RAMIREZ.     Contacted [facility] RN who reports patient has mild cognitive deficits.     New Orders:  -Prednisone 10 mg PO every day  -Ok to keep Acetaminophen in apartment.

## 2019-06-04 DIAGNOSIS — I25.10 CORONARY ARTERY DISEASE INVOLVING NATIVE HEART WITHOUT ANGINA PECTORIS, UNSPECIFIED VESSEL OR LESION TYPE: ICD-10-CM

## 2019-06-04 DIAGNOSIS — K59.00 CONSTIPATION, UNSPECIFIED CONSTIPATION TYPE: ICD-10-CM

## 2019-06-04 DIAGNOSIS — M1A.9XX0 CHRONIC GOUT WITHOUT TOPHUS, UNSPECIFIED CAUSE, UNSPECIFIED SITE: Primary | ICD-10-CM

## 2019-06-04 DIAGNOSIS — I25.5 ISCHEMIC CARDIOMYOPATHY: ICD-10-CM

## 2019-06-05 RX ORDER — ALLOPURINOL 300 MG/1
TABLET ORAL
Qty: 16 TABLET | Refills: 98 | Status: SHIPPED | OUTPATIENT
Start: 2019-06-05 | End: 2019-09-25

## 2019-06-05 RX ORDER — FUROSEMIDE 20 MG
TABLET ORAL
Qty: 31 TABLET | Refills: 98 | Status: SHIPPED | OUTPATIENT
Start: 2019-06-05

## 2019-06-05 RX ORDER — METOPROLOL SUCCINATE 25 MG/1
TABLET, EXTENDED RELEASE ORAL
Qty: 31 TABLET | Refills: 98 | Status: SHIPPED | OUTPATIENT
Start: 2019-06-05 | End: 2019-11-17

## 2019-06-05 RX ORDER — CLOPIDOGREL BISULFATE 75 MG/1
TABLET ORAL
Qty: 31 TABLET | Refills: 98 | Status: SHIPPED | OUTPATIENT
Start: 2019-06-05

## 2019-06-05 RX ORDER — PRAVASTATIN SODIUM 10 MG
TABLET ORAL
Qty: 31 TABLET | Refills: 98 | Status: SHIPPED | OUTPATIENT
Start: 2019-06-05

## 2019-06-05 RX ORDER — LISINOPRIL 5 MG/1
TABLET ORAL
Qty: 31 TABLET | Refills: 98 | Status: SHIPPED | OUTPATIENT
Start: 2019-06-05 | End: 2019-09-25

## 2019-06-05 RX ORDER — DOCUSATE SODIUM 100 MG/1
CAPSULE, LIQUID FILLED ORAL
Qty: 62 CAPSULE | Refills: 98 | Status: SHIPPED | OUTPATIENT
Start: 2019-06-05 | End: 2019-09-25

## 2019-06-06 ENCOUNTER — TRANSFERRED RECORDS (OUTPATIENT)
Dept: HEALTH INFORMATION MANAGEMENT | Facility: CLINIC | Age: 84
End: 2019-06-06

## 2019-06-06 ENCOUNTER — TELEPHONE (OUTPATIENT)
Dept: GERIATRICS | Facility: CLINIC | Age: 84
End: 2019-06-06

## 2019-06-06 DIAGNOSIS — J18.9 PNEUMONIA DUE TO INFECTIOUS ORGANISM, UNSPECIFIED LATERALITY, UNSPECIFIED PART OF LUNG: Primary | ICD-10-CM

## 2019-06-06 RX ORDER — LEVOFLOXACIN 500 MG/1
500 TABLET, FILM COATED ORAL DAILY
Qty: 5 TABLET | Refills: 0 | Status: SHIPPED | OUTPATIENT
Start: 2019-06-06 | End: 2019-06-15

## 2019-06-07 NOTE — TELEPHONE ENCOUNTER
"Received report from [facility] RN patient had missed Warfarin dose 6/3/19 through 6/5/19. INR 1.5 today. Previous Warfarin dose 5 mg on Tu, W, F, Sun and 2.5 mg on all other days. Ordered Warfarin 7.5 mg on 6/6/19 and 6/7/19 and Warfarin 5 mg on 6/8/19 and 6/9/19.     Nurse also noted crackles in lungs with labored breathing and increased respiratory rate. Ordered a chest x-ray which revealed \"1) streaky opacities consistent with bronchitis with superimposed left upper lobe infiltrate is seen\".    Contacted daughter, Rosalina, and discussed medication issues. Also noted findings on chest x-ray. Ordered Levaquin 500 mg PO every day x 5 days due to Penicillin allergy and recent use of Cefdinir for UTI with last dose ~ 5/11/19. Educated daughter on black box warnings of Levaquin including tendinitis. Daughter was in agreement with plan.     Re-ordered Warfarin 5 mg on 6/6/19 and 6/7/19 and 2.5 mg on 6/8/19 and 6/9/19. Repeat INR on 6/10/19.  "

## 2019-06-10 NOTE — PROGRESS NOTES
"[place] [facility]  [facility] Record Number:  [id number]  Place of Service where encounter took place:  [facility]) [[id number]]  Chief Complaint   Patient presents with     RECHECK     HPI:    Gladys Garrido  is a 89 year old (4/24/1929), who is being seen today for an episodic care visit.  HPI information obtained from: facility chart records, facility staff, patient report and [facility] chart review. Today's concern is:    Pneumonia. On 6/6/19, [facility] RN noted crackles in lungs with labored breathing and increased respiratory rate. Ordered a chest x-ray which revealed \"1) streaky opacities consistent with bronchitis with superimposed left upper lobe infiltrate is seen\". Started on Levofloxacin 500 mg PO daily x 5 days. Today, patient is resting in bed in the late morning. Reports some mucous in her cough. Doesn't last long. Was told by her daughter after talking her recently that she sounded better. Will be happy when she is done with the antibiotic as it is large in size. Denies shortness of breath.     Polymyalgia Rheumatica. Sed Rate 61 on 5/31/19. Complained of bilateral shoulder pain. States this is how her polymyalgia rheumatica started out the last time. Was previously on Prednisone daily. Re-started on Prednisone 10 mg. Today, patient reports she continues to have pain in the shoulders, but it has improved elsewhere. Feels it is bone on bone. Improved with Tylenol.     Carpal Tunnel Syndrome on Left. Sometimes has numbness in the left hand. Happens when using the hand. Improved when moving the hand. Used a glove in the past when experiencing these symptoms. Does not have the glove at her current apartment.     Iron Deficiency Anemia. Last Hemoglobin 10.3 on 5/23/19. Iron Saturation Index 14.    Tearfulness. During today's visit, patient is tearful. States she doesn't like having so many visitors at her apartment. Feels she does " fine in her day to day activities. Only becomes tearful when people come to visit and make her think. Talks about losing her . Discusses her 4 children who are busy with their own lives. Laying in bed in the late morning during today's visit and states this is because she didn't sleep well last night. Had to get up to use the bathroom. Doesn't always like going to activities at the [place] as cliques have formed.     Past Medical and Surgical History reviewed in Epic today.    MEDICATIONS:  Current Outpatient Medications   Medication Sig Dispense Refill     acetaminophen (TYLENOL) 500 MG tablet Take 1,000 mg by mouth every 6 hours as needed for mild pain       allopurinol (ZYLOPRIM) 300 MG tablet TAKE ONE-HALF TABLET (150MG) BY MOUTH ONCE DAILY 16 tablet 98     clopidogrel (PLAVIX) 75 MG tablet TAKE 1 TABLET BY MOUTH ONCE DAILY 31 tablet 98     docusate sodium (COLACE) 100 MG capsule TAKE 1 CAPSULE BY MOUTH TWICE DAILY 62 capsule 98     furosemide (LASIX) 20 MG tablet TAKE 1 TABLET BY MOUTH ONCE DAILY 31 tablet 98     levofloxacin (LEVAQUIN) 500 MG tablet Take 1 tablet (500 mg) by mouth daily 5 tablet 0     lisinopril (PRINIVIL/ZESTRIL) 5 MG tablet TAKE 1 TABLET BY MOUTH ONCE DAILY 31 tablet 98     metoprolol succinate ER (TOPROL-XL) 25 MG 24 hr tablet TAKE 1 TABLET BY MOUTH ONCE DAILY 31 tablet 98     nitroGLYcerin (NITROSTAT) 0.4 MG sublingual tablet Place 0.4 mg under the tongue every 5 minutes as needed for chest pain For chest pain place 1 tablet under the tongue every 5 minutes for 3 doses. If symptoms persist 5 minutes after 1st dose call 911.       pravastatin (PRAVACHOL) 10 MG tablet TAKE 1 TABLET BY MOUTH ONCE DAILY 31 tablet 98     predniSONE (DELTASONE) 10 MG tablet Take 1 tablet (10 mg) by mouth daily       warfarin (COUMADIN) 2.5 MG tablet Take by mouth daily 5 mg Tues/WedFri/Sun and 2.5 mg AOD       REVIEW OF SYSTEMS:  4 point ROS including Respiratory, CV, GI and , other than that noted in the  HPI,  is negative    Objective:  /72   Pulse 74   Temp 97.9  F (36.6  C)   Resp 19   Wt 67.9 kg (149 lb 9.6 oz)   Exam:  GENERAL APPEARANCE:  Alert, in no distress  ENT:  Mouth and posterior oropharynx normal, moist mucous membranes  EYES:  EOM, conjunctivae, lids, pupils and irises normal  RESP:  respiratory effort and palpation of chest normal, lungs clear to auscultation , no respiratory distress  CV:  Palpation and auscultation of heart done , regular rate and rhythm, no murmur, rub, or gallop  ABDOMEN:  normal bowel sounds, soft, nontender, no hepatosplenomegaly or other masses  M/S:   Active movement of bilateral upper and lower extremities. 1+ edema in BLE.  SKIN: Labia majora with no obvious lesions or redness  NEURO:   Cranial nerves 2-12 are normal tested and grossly at patient's baseline  PSYCH:  Tearful at times    Labs:   Labs done in SNF are in [place] EPIC. Please refer to them using [employer]/Care Everywhere.    ASSESSMENT/PLAN:  Pneumonia. Lung sounds clear. VSS. Continue course of Levofloxacin on 6/11/19.     Polymyalgia Rheumatica. Re-initiated on Prednisone 10 mg on 6/4/19. Since that time, patient notes improvement in her pain except in her bilateral shoulders. Improved with Acetaminophen. Questions if shoulder pain is related to PMR. Recommended patient be evaluated by [facility]. Patient states she does not want anymore people coming to her apartment. Asked if [facility] PA-C could call her on the phone, but patient declined. Continue Prednisone and Acetaminophen as ordered for now. Will re-approach Ortho evaluation in the future.    Iron Deficiency Anemia. Discussed initiating Iron supplement which patient initially agreed to. Reviewed side effects of medication including constipation. Patient reports she has issues with constipation and does not want to take anything that might make it worse. Refuses to try medication. Educated on risks versus benefits for taking medication. Patient  refuses Iron supplement at this time.     Carpal Tunnel Syndrome. With numbness noted in left hand. Chronic. Improved with splint. Patient reports she will have daughter bring down her splint the next time she goes to the cabin.    Tearfulness. Also noted during 5/29/19 visit. Patient does not feel she needs a medication to help with mood. Does not want children updated from today's visit. Does not want increased services in the home as she already has enough visitors. Resistant to medication changes as noted above. [facility] RN aware of patient's increased tearfulness. Will arrange a  follow-up and care conference.     Electronically signed by:  MIRZA Kwan CNP

## 2019-06-11 VITALS
RESPIRATION RATE: 19 BRPM | SYSTOLIC BLOOD PRESSURE: 117 MMHG | TEMPERATURE: 97.9 F | WEIGHT: 149.6 LBS | DIASTOLIC BLOOD PRESSURE: 72 MMHG | HEART RATE: 74 BPM

## 2019-06-12 ENCOUNTER — ASSISTED LIVING VISIT (OUTPATIENT)
Dept: GERIATRICS | Facility: CLINIC | Age: 84
End: 2019-06-12
Payer: MEDICARE

## 2019-06-12 DIAGNOSIS — G56.02 CARPAL TUNNEL SYNDROME ON LEFT: ICD-10-CM

## 2019-06-12 DIAGNOSIS — D50.9 IRON DEFICIENCY ANEMIA, UNSPECIFIED IRON DEFICIENCY ANEMIA TYPE: ICD-10-CM

## 2019-06-12 DIAGNOSIS — M35.3 POLYMYALGIA RHEUMATICA (H): ICD-10-CM

## 2019-06-12 DIAGNOSIS — J18.9 PNEUMONIA DUE TO INFECTIOUS ORGANISM, UNSPECIFIED LATERALITY, UNSPECIFIED PART OF LUNG: Primary | ICD-10-CM

## 2019-06-12 NOTE — LETTER
"    6/12/2019        RE: Gladys Garrido  [facility]  [address]  [facility] [address]        [facility]  [facility] Record Number:  [id number]  Place of Service where encounter took place:  [facility]) [[id number]]  Chief Complaint   Patient presents with     RECHECK     HPI:    Gladys Garrido  is a 89 year old (4/24/1929), who is being seen today for an episodic care visit.  HPI information obtained from: facility chart records, facility staff, patient report and [facility] chart review. Today's concern is:    Pneumonia. On 6/6/19, [facility] RN noted crackles in lungs with labored breathing and increased respiratory rate. Ordered a chest x-ray which revealed \"1) streaky opacities consistent with bronchitis with superimposed left upper lobe infiltrate is seen\". Started on Levofloxacin 500 mg PO daily x 5 days. Today, patient is resting in bed in the late morning. Reports some mucous in her cough. Doesn't last long. Was told by her daughter after talking her recently that she sounded better. Will be happy when she is done with the antibiotic as it is large in size. Denies shortness of breath.     Polymyalgia Rheumatica. Sed Rate 61 on 5/31/19. Complained of bilateral shoulder pain. States this is how her polymyalgia rheumatica started out the last time. Was previously on Prednisone daily. Re-started on Prednisone 10 mg. Today, patient reports she continues to have pain in the shoulders, but it has improved elsewhere. Feels it is bone on bone. Improved with Tylenol.     Carpal Tunnel Syndrome on Left. Sometimes has numbness in the left hand. Happens when using the hand. Improved when moving the hand. Used a glove in the past when experiencing these symptoms. Does not have the glove at her current apartment.     Iron Deficiency Anemia. Last Hemoglobin 10.3 on 5/23/19. Iron Saturation Index 14.    Tearfulness. During today's visit, " patient is tearful. States she doesn't like having so many visitors at her apartment. Feels she does fine in her day to day activities. Only becomes tearful when people come to visit and make her think. Talks about losing her . Discusses her 4 children who are busy with their own lives. Laying in bed in the late morning during today's visit and states this is because she didn't sleep well last night. Had to get up to use the bathroom. Doesn't always like going to activities at the AL as cliques have formed.     Past Medical and Surgical History reviewed in Epic today.    MEDICATIONS:  Current Outpatient Medications   Medication Sig Dispense Refill     acetaminophen (TYLENOL) 500 MG tablet Take 1,000 mg by mouth every 6 hours as needed for mild pain       allopurinol (ZYLOPRIM) 300 MG tablet TAKE ONE-HALF TABLET (150MG) BY MOUTH ONCE DAILY 16 tablet 98     clopidogrel (PLAVIX) 75 MG tablet TAKE 1 TABLET BY MOUTH ONCE DAILY 31 tablet 98     docusate sodium (COLACE) 100 MG capsule TAKE 1 CAPSULE BY MOUTH TWICE DAILY 62 capsule 98     furosemide (LASIX) 20 MG tablet TAKE 1 TABLET BY MOUTH ONCE DAILY 31 tablet 98     levofloxacin (LEVAQUIN) 500 MG tablet Take 1 tablet (500 mg) by mouth daily 5 tablet 0     lisinopril (PRINIVIL/ZESTRIL) 5 MG tablet TAKE 1 TABLET BY MOUTH ONCE DAILY 31 tablet 98     metoprolol succinate ER (TOPROL-XL) 25 MG 24 hr tablet TAKE 1 TABLET BY MOUTH ONCE DAILY 31 tablet 98     nitroGLYcerin (NITROSTAT) 0.4 MG sublingual tablet Place 0.4 mg under the tongue every 5 minutes as needed for chest pain For chest pain place 1 tablet under the tongue every 5 minutes for 3 doses. If symptoms persist 5 minutes after 1st dose call 911.       pravastatin (PRAVACHOL) 10 MG tablet TAKE 1 TABLET BY MOUTH ONCE DAILY 31 tablet 98     predniSONE (DELTASONE) 10 MG tablet Take 1 tablet (10 mg) by mouth daily       warfarin (COUMADIN) 2.5 MG tablet Take by mouth daily 5 mg Tues/WedFri/Sun and 2.5 mg AOD        REVIEW OF SYSTEMS:  4 point ROS including Respiratory, CV, GI and , other than that noted in the HPI,  is negative    Objective:  /72   Pulse 74   Temp 97.9  F (36.6  C)   Resp 19   Wt 67.9 kg (149 lb 9.6 oz)   Exam:  GENERAL APPEARANCE:  Alert, in no distress  ENT:  Mouth and posterior oropharynx normal, moist mucous membranes  EYES:  EOM, conjunctivae, lids, pupils and irises normal  RESP:  respiratory effort and palpation of chest normal, lungs clear to auscultation , no respiratory distress  CV:  Palpation and auscultation of heart done , regular rate and rhythm, no murmur, rub, or gallop  ABDOMEN:  normal bowel sounds, soft, nontender, no hepatosplenomegaly or other masses  M/S:   Active movement of bilateral upper and lower extremities. 1+ edema in BLE.  SKIN: Labia majora with no obvious lesions or redness  NEURO:   Cranial nerves 2-12 are normal tested and grossly at patient's baseline  PSYCH:  Tearful at times    Labs:   Labs done in SNF are in [facility]. Please refer to them using [employer]/Care Everywhere.    ASSESSMENT/PLAN:  Pneumonia. Lung sounds clear. VSS. Continue course of Levofloxacin on 6/11/19.     Polymyalgia Rheumatica. Re-initiated on Prednisone 10 mg on 6/4/19. Since that time, patient notes improvement in her pain except in her bilateral shoulders. Improved with Acetaminophen. Questions if shoulder pain is related to PMR. Recommended patient be evaluated by [facility]. Patient states she does not want anymore people coming to her apartment. Asked if [facility] PA-C could call her on the phone, but patient declined. Continue Prednisone and Acetaminophen as ordered for now. Will re-approach Ortho evaluation in the future.    Iron Deficiency Anemia. Discussed initiating Iron supplement which patient initially agreed to. Reviewed side effects of medication including constipation. Patient reports she has issues with constipation and does not want to take anything that might  make it worse. Refuses to try medication. Educated on risks versus benefits for taking medication. Patient refuses Iron supplement at this time.     Carpal Tunnel Syndrome. With numbness noted in left hand. Chronic. Improved with splint. Patient reports she will have daughter bring down her splint the next time she goes to the cabin.    Tearfulness. Also noted during 5/29/19 visit. Patient does not feel she needs a medication to help with mood. Does not want children updated from today's visit. Does not want increased services in the home as she already has enough visitors. Resistant to medication changes as noted above. [facility] RN aware of patient's increased tearfulness. Will arrange a SW follow-up and care conference.     Electronically signed by:  MIRZA Kwan CNP             Sincerely,        MIRZA Kwan CNP

## 2019-06-20 ENCOUNTER — RECORDS - HEALTHEAST (OUTPATIENT)
Dept: LAB | Facility: CLINIC | Age: 84
End: 2019-06-20

## 2019-06-21 LAB — INR PPP: 1.91 (ref 0.9–1.1)

## 2019-07-02 DIAGNOSIS — M35.3 PMR (POLYMYALGIA RHEUMATICA) (H): ICD-10-CM

## 2019-07-03 RX ORDER — PREDNISONE 10 MG/1
TABLET ORAL
Qty: 31 TABLET | Refills: 98 | Status: SHIPPED | OUTPATIENT
Start: 2019-07-03

## 2019-07-20 NOTE — PROGRESS NOTES
[facility]  [facility] Record Number:  [id number]  Place of Service where encounter took place:  [facility] [[id number]]  Chief Complaint   Patient presents with     RECHECK     HPI:    Gladys Garrido  is a 89 year old (4/24/1929), who is being seen today for an episodic care visit.  HPI information obtained from: facility chart records, facility staff, patient report and [facility] chart review. Today's concern is:    Chronic Right Shoulder Pain. Complains of right shoulder pain. Unable to raise shoulder above head. States it is bone on bone. Describes diagnosis of PMR based on right shoulder pain and being started on Prednisone by previous primary care provider in [place]. Upon review of documentation, has not utilized Acetaminophen since May 2019.     Tearfulness. Is waiting to meet with  to discuss concerns at facility. Reports medications were not given to her and this resulted in a hospital stay. Concerned doctor at hospital thought it was her fault medications weren't taken. Is planning to go to her cabin on Friday. Asked staff for medications to take with her and has yet to hear back if she can take them. Enjoys going to the cabin as she remembers coming up with plans for the cabin with her . Feels closer to him when at the cabin. Doesn't feel like people listen to her like they did at her clinic in [place].Doesn't want her daughter to be bothered as she travels for work. Requests writer leave.    Coronary Artery Disease/STEMI S/P PTCA to Distal RCA/PDA/BRENDA Trifurcation/Hypertension. Seen by Cardiology on 7/8/19. Recommend echocardiogram in 3 months and follow-up in 6 months. Carotid bruit on right. Carotid ultrasound was to be arranged. No changes to medications made. Has had 2 blood pressures in the past month, 105/61 and 109/63.    Chronic Atrial Fibrillation on Anticoagulation. Questions why INR is at 2. Feels it  should be higher as an INR in the 1s puts her a higher risk for a stroke. Used to be at 2.5 faithfully. Avoids all greens except peas as she does not want this to affect her INR. Last INR 2.0 on 7/17/19. Continues on Warfarin 2.5 mg on Tuesday, Thursday, Friday, Sun and 5 mg on Monday, Wednesday, Saturday. Previous INR 2.0 on 7/3/19.  Heart rate 61-74 this month.     Past Medical and Surgical History reviewed in Epic today.    MEDICATIONS:  Current Outpatient Medications   Medication Sig Dispense Refill     acetaminophen (TYLENOL) 500 MG tablet Take 1,000 mg by mouth every 6 hours as needed for mild pain       allopurinol (ZYLOPRIM) 300 MG tablet TAKE ONE-HALF TABLET (150MG) BY MOUTH ONCE DAILY 16 tablet 98     clopidogrel (PLAVIX) 75 MG tablet TAKE 1 TABLET BY MOUTH ONCE DAILY 31 tablet 98     docusate sodium (COLACE) 100 MG capsule TAKE 1 CAPSULE BY MOUTH TWICE DAILY 62 capsule 98     furosemide (LASIX) 20 MG tablet TAKE 1 TABLET BY MOUTH ONCE DAILY 31 tablet 98     lisinopril (PRINIVIL/ZESTRIL) 5 MG tablet TAKE 1 TABLET BY MOUTH ONCE DAILY 31 tablet 98     metoprolol succinate ER (TOPROL-XL) 25 MG 24 hr tablet TAKE 1 TABLET BY MOUTH ONCE DAILY 31 tablet 98     nitroGLYcerin (NITROSTAT) 0.4 MG sublingual tablet Place 0.4 mg under the tongue every 5 minutes as needed for chest pain For chest pain place 1 tablet under the tongue every 5 minutes for 3 doses. If symptoms persist 5 minutes after 1st dose call 911.       pravastatin (PRAVACHOL) 10 MG tablet TAKE 1 TABLET BY MOUTH ONCE DAILY 31 tablet 98     predniSONE (DELTASONE) 10 MG tablet TAKE 1 TABLET BY MOUTH ONCE DAILY 31 tablet 98     warfarin (COUMADIN) 2.5 MG tablet Take by mouth daily 5 mg MON/WED/SAT and 2.5 mg AOD       REVIEW OF SYSTEMS:  4 point ROS including Respiratory, CV, GI and , other than that noted in the HPI,  is negative    Objective:  /61   Pulse 61   Temp 97.8  F (36.6  C)   Resp 20   Wt 66 kg (145 lb 9.6 oz)   Exam:  Refuses  Physical Exam    Labs:   Labs done in SNF are in [facility]. Please refer to them using EPIC/Care Everywhere.    ASSESSMENT/PLAN:  Chronic Right Shoulder Pain. Question if this is related to PMR as only 1 joint is involved. Recommended patient be seen by [facility] for further evaluation and pain management. Patient initially refuses stating she is going up to her cabin on Friday. When explained Ortho PA-C could see her after she returned from her trip, still refused. Taking Prednisone 10 mg for PMR. Will discuss at next visit plan for steroid taper as this was initiated 6/4/19. Acetaminophen available as needed.     Tearfulness. Present during the past 3 visits with patient. Spoke to daughter, Rosalina, via telephone from 253 pm to 317 pm. Discussed patient's tearfulness and refusal to agree to medications, consults, etc. Daughter reports this is baseline for patient. Patient is also frustrated with inability to live at cabin during the summer as she did last summer as well as  passing away 1.5 years ago. Daughter reports patient had previously agreed during care conference that daughter could be contacted by medical professionals. Daughter will talk to siblings to decide next steps.     Coronary Artery Disease/STEMI S/P PTCA to Distal RCA/PDA/BRENDA Trifurcation/Hypertension. Seen by Cardiology on 7/8/19. Recommend echocardiogram in 3 months and follow-up in 6 months. Carotid bruit on right. Carotid ultrasound was to be arranged. Monitor blood pressure monthly. Continue Clopidogrel x 1 year around March 2020, Lisinopril and Metoprolol as ordered. Also on Furosemide.     Chronic Atrial Fibrillation on Anticoagulation. Educated patient on eating green leafy vegetables in moderation as well as goal INR of 2-3.  Continue Warfarin 2.5 mg on Tuesday, Thursday, Friday, Sun and 5 mg on Monday, Wednesday, Saturday as ordered. Repeat INR on 7/31/19. Taking Metoprolol.      Electronically signed by:  Bridgett Don  MIRZA Delarosa CNP

## 2019-07-24 ENCOUNTER — ASSISTED LIVING VISIT (OUTPATIENT)
Dept: GERIATRICS | Facility: CLINIC | Age: 84
End: 2019-07-24
Payer: MEDICARE

## 2019-07-24 VITALS
HEART RATE: 61 BPM | TEMPERATURE: 97.8 F | WEIGHT: 145.6 LBS | RESPIRATION RATE: 20 BRPM | SYSTOLIC BLOOD PRESSURE: 105 MMHG | DIASTOLIC BLOOD PRESSURE: 61 MMHG

## 2019-07-24 DIAGNOSIS — M25.511 CHRONIC RIGHT SHOULDER PAIN: Primary | ICD-10-CM

## 2019-07-24 DIAGNOSIS — I48.20 CHRONIC ATRIAL FIBRILLATION (H): ICD-10-CM

## 2019-07-24 DIAGNOSIS — R45.89 TEARFULNESS: ICD-10-CM

## 2019-07-24 DIAGNOSIS — G89.29 CHRONIC RIGHT SHOULDER PAIN: Primary | ICD-10-CM

## 2019-07-24 DIAGNOSIS — I25.10 CORONARY ARTERY DISEASE INVOLVING NATIVE HEART WITHOUT ANGINA PECTORIS, UNSPECIFIED VESSEL OR LESION TYPE: ICD-10-CM

## 2019-07-24 NOTE — LETTER
7/24/2019        RE: Gladys Garrido  [facility]  [address]  [facility] [address]        [place] [facility]  [place] Medical Record Number:  [id number]  Place of Service where encounter took place:  [facility] ([facility]) [[id number]]  Chief Complaint   Patient presents with     RECHECK     HPI:    Gladys Garrido  is a 89 year old (4/24/1929), who is being seen today for an episodic care visit.  HPI information obtained from: facility chart records, facility staff, patient report and [facility] chart review. Today's concern is:    Chronic Right Shoulder Pain. Complains of right shoulder pain. Unable to raise shoulder above head. States it is bone on bone. Describes diagnosis of PMR based on right shoulder pain and being started on Prednisone by previous primary care provider in [place]. Upon review of documentation, has not utilized Acetaminophen since May 2019.     Tearfulness. Is waiting to meet with  to discuss concerns at facility. Reports medications were not given to her and this resulted in a hospital stay. Concerned doctor at hospital thought it was her fault medications weren't taken. Is planning to go to her cabin on Friday. Asked staff for medications to take with her and has yet to hear back if she can take them. Enjoys going to the cabin as she remembers coming up with plans for the cabin with her . Feels closer to him when at the cabin. Doesn't feel like people listen to her like they did at her clinic in [place].Doesn't want her daughter to be bothered as she travels for work. Requests writer leave.    Coronary Artery Disease/STEMI S/P PTCA to Distal RCA/PDA/BRENDA Trifurcation/Hypertension. Seen by Cardiology on 7/8/19. Recommend echocardiogram in 3 months and follow-up in 6 months. Carotid bruit on right. Carotid ultrasound was to be arranged. No changes to medications made. Has had 2 blood pressures in the past month,  105/61 and 109/63.    Chronic Atrial Fibrillation on Anticoagulation. Questions why INR is at 2. Feels it should be higher as an INR in the 1s puts her a higher risk for a stroke. Used to be at 2.5 faithfully. Avoids all greens except peas as she does not want this to affect her INR. Last INR 2.0 on 7/17/19. Continues on Warfarin 2.5 mg on Tuesday, Thursday, Friday, Sun and 5 mg on Monday, Wednesday, Saturday. Previous INR 2.0 on 7/3/19.  Heart rate 61-74 this month.     Past Medical and Surgical History reviewed in Epic today.    MEDICATIONS:  Current Outpatient Medications   Medication Sig Dispense Refill     acetaminophen (TYLENOL) 500 MG tablet Take 1,000 mg by mouth every 6 hours as needed for mild pain       allopurinol (ZYLOPRIM) 300 MG tablet TAKE ONE-HALF TABLET (150MG) BY MOUTH ONCE DAILY 16 tablet 98     clopidogrel (PLAVIX) 75 MG tablet TAKE 1 TABLET BY MOUTH ONCE DAILY 31 tablet 98     docusate sodium (COLACE) 100 MG capsule TAKE 1 CAPSULE BY MOUTH TWICE DAILY 62 capsule 98     furosemide (LASIX) 20 MG tablet TAKE 1 TABLET BY MOUTH ONCE DAILY 31 tablet 98     lisinopril (PRINIVIL/ZESTRIL) 5 MG tablet TAKE 1 TABLET BY MOUTH ONCE DAILY 31 tablet 98     metoprolol succinate ER (TOPROL-XL) 25 MG 24 hr tablet TAKE 1 TABLET BY MOUTH ONCE DAILY 31 tablet 98     nitroGLYcerin (NITROSTAT) 0.4 MG sublingual tablet Place 0.4 mg under the tongue every 5 minutes as needed for chest pain For chest pain place 1 tablet under the tongue every 5 minutes for 3 doses. If symptoms persist 5 minutes after 1st dose call 911.       pravastatin (PRAVACHOL) 10 MG tablet TAKE 1 TABLET BY MOUTH ONCE DAILY 31 tablet 98     predniSONE (DELTASONE) 10 MG tablet TAKE 1 TABLET BY MOUTH ONCE DAILY 31 tablet 98     warfarin (COUMADIN) 2.5 MG tablet Take by mouth daily 5 mg MON/WED/SAT and 2.5 mg AOD       REVIEW OF SYSTEMS:  4 point ROS including Respiratory, CV, GI and , other than that noted in the HPI,  is negative    Objective:  BP  105/61   Pulse 61   Temp 97.8  F (36.6  C)   Resp 20   Wt 66 kg (145 lb 9.6 oz)   Exam:  Refuses Physical Exam    Labs:   Labs done in SNF are in [facility]. Please refer to them using [facility]Care Everywhere.    ASSESSMENT/PLAN:  Chronic Right Shoulder Pain. Question if this is related to PMR as only 1 joint is involved. Recommended patient be seen by [facility] Alyssa RAMIREZ for further evaluation and pain management. Patient initially refuses stating she is going up to her cabin on Friday. When explained Ortho PA-C could see her after she returned from her trip, still refused. Taking Prednisone 10 mg for PMR. Will discuss at next visit plan for steroid taper as this was initiated 6/4/19. Acetaminophen available as needed.     Tearfulness. Present during the past 3 visits with patient. Spoke to daughter, Rosalina, via telephone from 253 pm to 317 pm. Discussed patient's tearfulness and refusal to agree to medications, consults, etc. Daughter reports this is baseline for patient. Patient is also frustrated with inability to live at cabin during the summer as she did last summer as well as  passing away 1.5 years ago. Daughter reports patient had previously agreed during care conference that daughter could be contacted by medical professionals. Daughter will talk to siblings to decide next steps.     Coronary Artery Disease/STEMI S/P PTCA to Distal RCA/PDA/BRENDA Trifurcation/Hypertension. Seen by Cardiology on 7/8/19. Recommend echocardiogram in 3 months and follow-up in 6 months. Carotid bruit on right. Carotid ultrasound was to be arranged. Monitor blood pressure monthly. Continue Clopidogrel x 1 year around March 2020, Lisinopril and Metoprolol as ordered. Also on Furosemide.     Chronic Atrial Fibrillation on Anticoagulation. Educated patient on eating green leafy vegetables in moderation as well as goal INR of 2-3.  Continue Warfarin 2.5 mg on Tuesday, Thursday, Friday, Sun and 5 mg on Monday, Wednesday, Saturday as  ordered. Repeat INR on 7/31/19. Taking Metoprolol.      Electronically signed by:  MIRZA Kwan CNP            Sincerely,        MIRZA Kwan CNP

## 2019-07-31 ENCOUNTER — RECORDS - HEALTHEAST (OUTPATIENT)
Dept: LAB | Facility: CLINIC | Age: 84
End: 2019-07-31

## 2019-08-01 LAB — INR PPP: 1.74 (ref 0.9–1.1)

## 2019-08-03 ENCOUNTER — RECORDS - HEALTHEAST (OUTPATIENT)
Dept: LAB | Facility: CLINIC | Age: 84
End: 2019-08-03

## 2019-08-05 LAB — INR PPP: 2.08 (ref 0.9–1.1)

## 2019-08-06 VITALS
TEMPERATURE: 96.7 F | SYSTOLIC BLOOD PRESSURE: 104 MMHG | RESPIRATION RATE: 16 BRPM | WEIGHT: 147.8 LBS | HEART RATE: 61 BPM | DIASTOLIC BLOOD PRESSURE: 60 MMHG

## 2019-08-07 ENCOUNTER — ASSISTED LIVING VISIT (OUTPATIENT)
Dept: GERIATRICS | Facility: CLINIC | Age: 84
End: 2019-08-07
Payer: MEDICARE

## 2019-08-07 DIAGNOSIS — I25.5 ISCHEMIC CARDIOMYOPATHY: ICD-10-CM

## 2019-08-07 DIAGNOSIS — G89.29 CHRONIC RIGHT SHOULDER PAIN: ICD-10-CM

## 2019-08-07 DIAGNOSIS — M25.511 CHRONIC RIGHT SHOULDER PAIN: ICD-10-CM

## 2019-08-07 DIAGNOSIS — M35.3 PMR (POLYMYALGIA RHEUMATICA) (H): Primary | ICD-10-CM

## 2019-08-07 NOTE — LETTER
8/7/2019        RE: Gladys Garrido  [facility]  [address]  [facility] [address]        [place] [facility]  [place] Medical Record Number:  [id number]  Place of Service where encounter took place:  [facility] ([facility]) [[id number]]  Chief Complaint   Patient presents with     RECHECK     HPI:    Gladys Garrido  is a 89 year old (4/24/1929), who is being seen today for an episodic care visit.  HPI information obtained from: facility chart records, facility staff, patient report, [facility] chart review, Care Everywhere [facility] chart review and family/first contact son, Ceferino, report. Today's concern is:    Polymyalgia Rheumatica. Complains of shoulder pain. Feels it's related to her polymyalgia rheumatica. Believes the Prednisone might be making some difference. Does not want dosage adjusted.     Chronic Right Shoulder Pain. Complains of right shoulder pain. States she can't raise it above her head. Takes Tylenol for the pain. Feels it's related to her polymyalgia rheumatica. Upon review of documentation, Acetaminophen has not been administered by staff, but patient is able to leave bottle in her room and take as needed.    Ischemic Cardiomyopathy with EF 45-50% on 4/24/19. Denies shortness of breath. Upon review of weights, 154.2 lbs on 5/15/19 -> 147.8 lbs on 7/24/19. Discharge hospital weight 145 lbs on 5/5/19 and 3/26/19     Past Medical and Surgical History reviewed in Epic today.    MEDICATIONS:  Current Outpatient Medications   Medication Sig Dispense Refill     acetaminophen (ACETAMINOPHEN EXTRA STRENGTH) 500 MG tablet Take 2 tablets (1,000 mg) by mouth 3 times daily as needed for mild pain 28 tablet 97     allopurinol (ZYLOPRIM) 300 MG tablet TAKE ONE-HALF TABLET (150MG) BY MOUTH ONCE DAILY 16 tablet 98     clopidogrel (PLAVIX) 75 MG tablet TAKE 1 TABLET BY MOUTH ONCE DAILY 31 tablet 98     docusate sodium (COLACE) 100 MG capsule TAKE 1 CAPSULE BY MOUTH  TWICE DAILY 62 capsule 98     furosemide (LASIX) 20 MG tablet TAKE 1 TABLET BY MOUTH ONCE DAILY 31 tablet 98     lisinopril (PRINIVIL/ZESTRIL) 5 MG tablet TAKE 1 TABLET BY MOUTH ONCE DAILY 31 tablet 98     metoprolol succinate ER (TOPROL-XL) 25 MG 24 hr tablet TAKE 1 TABLET BY MOUTH ONCE DAILY 31 tablet 98     nitroGLYcerin (NITROSTAT) 0.4 MG sublingual tablet Place 0.4 mg under the tongue every 5 minutes as needed for chest pain For chest pain place 1 tablet under the tongue every 5 minutes for 3 doses. If symptoms persist 5 minutes after 1st dose call 911.       pravastatin (PRAVACHOL) 10 MG tablet TAKE 1 TABLET BY MOUTH ONCE DAILY 31 tablet 98     predniSONE (DELTASONE) 10 MG tablet TAKE 1 TABLET BY MOUTH ONCE DAILY 31 tablet 98     warfarin (COUMADIN) 2.5 MG tablet Take by mouth daily 5 mg MON/WED/SAT and 2.5 mg AOD       REVIEW OF SYSTEMS:  4 point ROS including Respiratory, CV, GI and , other than that noted in the HPI,  is negative    Objective:  /60   Pulse 61   Temp 96.7  F (35.9  C)   Resp 16   Wt 67 kg (147 lb 12.8 oz)   Exam:  GENERAL APPEARANCE:  Alert, in no distress  ENT:  Mouth and posterior oropharynx normal, moist mucous membranes  EYES:  EOM, conjunctivae, lids, pupils and irises normal  RESP:  respiratory effort and palpation of chest normal, lungs clear to auscultation , no respiratory distress  CV:  Palpation and auscultation of heart done , regular rate and rhythm, no murmur, rub, or gallop  ABDOMEN:  normal bowel sounds, soft, nontender, no hepatosplenomegaly or other masses  M/S:   Active movement of bilateral upper and lower extremities. 1+-2+ edema in BLE.  SKIN: Inspection and palpation at baseline.  NEURO:   Cranial nerves 2-12 are normal tested and grossly at patient's baseline  PSYCH:  Tearful at times    Labs:   Labs done in SNF are in [place] EPIC. Please refer to them using [facility]/Care Everywhere.    ASSESSMENT/PLAN:  Polymyalgia Rheumatica. Re-initiated on Prednisone 10  mg on 6/4/19. Since that time, patient notes improvement in her pain except in her bilateral shoulders. Improved with Acetaminophen.Recommended attempting decrease in Prednisone, but patient is not interested at this time. Communicated with son, Ceferino, via email. He recommended drawing an ESR as if that is in WNL, patient may be more open to decreasing Prednisone. Will check ESR on next INR day.     Chronic Right Shoulder Pain. Question if shoulder pain is related to PMR. May be arthritis. Recommended patient be evaluated by [facility] Ortho PA-C. Patient states she does not want anymore people coming to her apartment. Updated son, Ceferino, on patient's refusal.     Ischemic Cardiomyopathy with EF 45-50% on 4/24/19. Weights stable. Last seen by [facility] on 7/8/19. Follow-up echocardiogram in 3-6 months. Continue Furosemide, Lisinopril and Metoprolol as ordered.     Electronically signed by:  MIRZA Kwan CNP             Sincerely,        MIRZA Kwan CNP

## 2019-08-07 NOTE — PROGRESS NOTES
[place] [facility]  [place] Medical Record Number:  [id number]  Place of Service where encounter took place:  [facility] ([facility]) [[id number]]  Chief Complaint   Patient presents with     RECHECK     HPI:    Gladys Garrido  is a 89 year old (4/24/1929), who is being seen today for an episodic care visit.  HPI information obtained from: facility chart records, facility staff, patient report, [facility] chart review, Care Everywhere [facility] chart review and family/first contact son, Ceferino, report. Today's concern is:    Polymyalgia Rheumatica. Complains of shoulder pain. Feels it's related to her polymyalgia rheumatica. Believes the Prednisone might be making some difference. Does not want dosage adjusted.     Chronic Right Shoulder Pain. Complains of right shoulder pain. States she can't raise it above her head. Takes Tylenol for the pain. Feels it's related to her polymyalgia rheumatica. Upon review of documentation, Acetaminophen has not been administered by staff, but patient is able to leave bottle in her room and take as needed.    Ischemic Cardiomyopathy with EF 45-50% on 4/24/19. Denies shortness of breath. Upon review of weights, 154.2 lbs on 5/15/19 -> 147.8 lbs on 7/24/19. Discharge hospital weight 145 lbs on 5/5/19 and 3/26/19     Past Medical and Surgical History reviewed in Epic today.    MEDICATIONS:  Current Outpatient Medications   Medication Sig Dispense Refill     acetaminophen (ACETAMINOPHEN EXTRA STRENGTH) 500 MG tablet Take 2 tablets (1,000 mg) by mouth 3 times daily as needed for mild pain 28 tablet 97     allopurinol (ZYLOPRIM) 300 MG tablet TAKE ONE-HALF TABLET (150MG) BY MOUTH ONCE DAILY 16 tablet 98     clopidogrel (PLAVIX) 75 MG tablet TAKE 1 TABLET BY MOUTH ONCE DAILY 31 tablet 98     docusate sodium (COLACE) 100 MG capsule TAKE 1 CAPSULE BY MOUTH TWICE DAILY 62 capsule 98     furosemide (LASIX) 20 MG tablet TAKE 1 TABLET BY MOUTH ONCE DAILY 31  tablet 98     lisinopril (PRINIVIL/ZESTRIL) 5 MG tablet TAKE 1 TABLET BY MOUTH ONCE DAILY 31 tablet 98     metoprolol succinate ER (TOPROL-XL) 25 MG 24 hr tablet TAKE 1 TABLET BY MOUTH ONCE DAILY 31 tablet 98     nitroGLYcerin (NITROSTAT) 0.4 MG sublingual tablet Place 0.4 mg under the tongue every 5 minutes as needed for chest pain For chest pain place 1 tablet under the tongue every 5 minutes for 3 doses. If symptoms persist 5 minutes after 1st dose call 911.       pravastatin (PRAVACHOL) 10 MG tablet TAKE 1 TABLET BY MOUTH ONCE DAILY 31 tablet 98     predniSONE (DELTASONE) 10 MG tablet TAKE 1 TABLET BY MOUTH ONCE DAILY 31 tablet 98     warfarin (COUMADIN) 2.5 MG tablet Take by mouth daily 5 mg MON/WED/SAT and 2.5 mg AOD       REVIEW OF SYSTEMS:  4 point ROS including Respiratory, CV, GI and , other than that noted in the HPI,  is negative    Objective:  /60   Pulse 61   Temp 96.7  F (35.9  C)   Resp 16   Wt 67 kg (147 lb 12.8 oz)   Exam:  GENERAL APPEARANCE:  Alert, in no distress  ENT:  Mouth and posterior oropharynx normal, moist mucous membranes  EYES:  EOM, conjunctivae, lids, pupils and irises normal  RESP:  respiratory effort and palpation of chest normal, lungs clear to auscultation , no respiratory distress  CV:  Palpation and auscultation of heart done , regular rate and rhythm, no murmur, rub, or gallop  ABDOMEN:  normal bowel sounds, soft, nontender, no hepatosplenomegaly or other masses  M/S:   Active movement of bilateral upper and lower extremities. 1+-2+ edema in BLE.  SKIN: Inspection and palpation at baseline.  NEURO:   Cranial nerves 2-12 are normal tested and grossly at patient's baseline  PSYCH:  Tearful at times    Labs:   Labs done in SNF are in [place] EPIC. Please refer to them using [facility]/Care Everywhere.    ASSESSMENT/PLAN:  Polymyalgia Rheumatica. Re-initiated on Prednisone 10 mg on 6/4/19. Since that time, patient notes improvement in her pain except in her bilateral  shoulders. Improved with Acetaminophen.Recommended attempting decrease in Prednisone, but patient is not interested at this time. Communicated with son, Ceferino, via email. He recommended drawing an ESR as if that is in WNL, patient may be more open to decreasing Prednisone. Will check ESR on next INR day.     Chronic Right Shoulder Pain. Question if shoulder pain is related to PMR. May be arthritis. Recommended patient be evaluated by [facility] Ortho PA-C. Patient states she does not want anymore people coming to her apartment. Updated son, Ceferino, on patient's refusal.     Ischemic Cardiomyopathy with EF 45-50% on 4/24/19. Weights stable. Last seen by [facility] on 7/8/19. Follow-up echocardiogram in 3-6 months. Continue Furosemide, Lisinopril and Metoprolol as ordered.     Electronically signed by:  MIRZA Kwan CNP

## 2019-08-08 ENCOUNTER — RECORDS - HEALTHEAST (OUTPATIENT)
Dept: LAB | Facility: CLINIC | Age: 84
End: 2019-08-08

## 2019-08-08 LAB — INR PPP: 1.81 (ref 0.9–1.1)

## 2019-08-16 ENCOUNTER — RECORDS - HEALTHEAST (OUTPATIENT)
Dept: LAB | Facility: CLINIC | Age: 84
End: 2019-08-16

## 2019-08-16 ENCOUNTER — TRANSFERRED RECORDS (OUTPATIENT)
Dept: HEALTH INFORMATION MANAGEMENT | Facility: CLINIC | Age: 84
End: 2019-08-16

## 2019-08-16 LAB
ERYTHROCYTE [SEDIMENTATION RATE] IN BLOOD BY WESTERGREN METHOD: 21 MM/HR (ref 0–20)
INR PPP: 2.25 (ref 0.9–1.1)
INR PPP: 2.25 (ref 0.9–1.1)

## 2019-08-22 ENCOUNTER — APPOINTMENT (OUTPATIENT)
Age: 84
Setting detail: DERMATOLOGY
End: 2019-08-25

## 2019-08-22 ENCOUNTER — ASSISTED LIVING VISIT (OUTPATIENT)
Dept: GERIATRICS | Facility: CLINIC | Age: 84
End: 2019-08-22
Payer: MEDICARE

## 2019-08-22 DIAGNOSIS — M25.511 CHRONIC RIGHT SHOULDER PAIN: Primary | ICD-10-CM

## 2019-08-22 DIAGNOSIS — G89.29 CHRONIC RIGHT SHOULDER PAIN: Primary | ICD-10-CM

## 2019-08-22 PROBLEM — C44.91 BASAL CELL CARCINOMA OF SKIN, UNSPECIFIED: Status: ACTIVE | Noted: 2019-08-22

## 2019-08-22 PROCEDURE — OTHER MOHS SURGERY PHONE CONSULTATION: OTHER

## 2019-08-22 NOTE — PROGRESS NOTES
Ortho Nursing home visit    Gladys Garrido is a 89 year old female who resides at [place], [place]    Patient is seen today for Right shoulder pain , has hs of PMR/ and limited use  Of right UE; using [employer] for transportation to dining area; today seen with daughter, discussed x-rays with son prior to today's visit;      No past medical history on file.   No past surgical history on file.     Allergies   Allergen Reactions     Aspirin      Codeine      Penicillins      Sulfa Drugs       There were no vitals taken for this visit.     Exam: Seated, observed eating, not happy today to be center of attention, and does not want to participate in exam.    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    [id number]          [facility]  [phone]

## 2019-08-23 ENCOUNTER — RECORDS - HEALTHEAST (OUTPATIENT)
Dept: LAB | Facility: CLINIC | Age: 84
End: 2019-08-23

## 2019-08-26 LAB — INR PPP: 2.67 (ref 0.9–1.1)

## 2019-09-04 ENCOUNTER — OFFICE VISIT (OUTPATIENT)
Dept: PHARMACY | Facility: CLINIC | Age: 84
End: 2019-09-04
Payer: MEDICARE

## 2019-09-04 DIAGNOSIS — K21.9 GASTROESOPHAGEAL REFLUX DISEASE WITHOUT ESOPHAGITIS: ICD-10-CM

## 2019-09-04 DIAGNOSIS — M35.3 POLYMYALGIA RHEUMATICA (H): ICD-10-CM

## 2019-09-04 DIAGNOSIS — K59.00 CONSTIPATION, UNSPECIFIED CONSTIPATION TYPE: ICD-10-CM

## 2019-09-04 DIAGNOSIS — R19.7 DIARRHEA, UNSPECIFIED TYPE: ICD-10-CM

## 2019-09-04 DIAGNOSIS — I25.10 CORONARY ARTERY DISEASE INVOLVING NATIVE CORONARY ARTERY OF NATIVE HEART WITHOUT ANGINA PECTORIS: Primary | ICD-10-CM

## 2019-09-04 DIAGNOSIS — E63.9 NUTRITIONAL DEFICIENCY: ICD-10-CM

## 2019-09-04 DIAGNOSIS — I48.91 ATRIAL FIBRILLATION, UNSPECIFIED TYPE (H): ICD-10-CM

## 2019-09-04 DIAGNOSIS — E78.5 HYPERLIPIDEMIA LDL GOAL <100: ICD-10-CM

## 2019-09-04 DIAGNOSIS — I50.22 CHRONIC SYSTOLIC CONGESTIVE HEART FAILURE (H): ICD-10-CM

## 2019-09-04 DIAGNOSIS — I10 BENIGN ESSENTIAL HYPERTENSION: ICD-10-CM

## 2019-09-04 DIAGNOSIS — M1A.9XX0 CHRONIC GOUT WITHOUT TOPHUS, UNSPECIFIED CAUSE, UNSPECIFIED SITE: ICD-10-CM

## 2019-09-04 DIAGNOSIS — R52 PAIN: ICD-10-CM

## 2019-09-04 PROCEDURE — 99605 MTMS BY PHARM NP 15 MIN: CPT | Mod: GY | Performed by: PHARMACIST

## 2019-09-04 PROCEDURE — 99607 MTMS BY PHARM ADDL 15 MIN: CPT | Mod: GY | Performed by: PHARMACIST

## 2019-09-04 NOTE — PROGRESS NOTES
"SUBJECTIVE/OBJECTIVE:                           Gladys Garrido is a 89 year old female seen at their home for an initial visit for Medication Therapy Management.  She was referred to me from Bridgett Delarosa, her nurse practitioner.  She consented to me calling her son Ceferino as well.  Ceferino is a retired physician, and I did speak with him.    Chief Complaint: initial med review.      Allergies/ADRs: Reviewed in Epic  Tobacco: No tobacco use  Alcohol: did not discuss  Caffeine: 1 cups/day of coffee  Activity: ambulates with walker  PMH: Reviewed in Epic    Medication Adherence/Access:  no issues reported  Patient has assistance with medication administration: assisted living.    Afib/HTN/CAD/HFrEF/Hyperlipidemia: Patient is currently taking warfarin 2.5mg on M/Fri and 5mg all other days for anticoagulation.  Also on Clopidogrel 75mg daily; h/o CAD with MI/ prior balloon angioplasty to the distal RCA in March 2019 (no stent placement), and noted on chart review h/o NSTEMI.  Patient reports bruises easily. Patient does not have a hx of GI bleed.   Patient is also taking Furosemide 20mg daily, Lisinopril 5mg qam, Metoprolol ER 25mg qam, Pravastatin 10mg qpm, prn NTG. Pt reports the following potential medication side effects: frequent urination, weakness, fatigue, loose stools.   Denies dizziness.  States no falls since living at facility; reports a fall back when she was living in her home quite some time ago.  Denies SOB, palpitations, chest pain.  Weighs herself daily and notes weights have been stable.  BP Readings from Last 3 Encounters:   07/25/19 104/60   07/24/19 105/61   06/06/19 117/72     HRs: 80, 74, 61    Has appt with cardiology on 10/7/19.  Pt notes that son has brought up discussing with cardiology if Plavix could be stopped at next visit (due to significant bruising on arms).  7/8/19 cardiology visit indicates:  \"continue on Plavix 75 mg (would ideally treat for one year in setting of inferior STEMI) if " "tolerating, lisinopril 5 mg daily, and Toprol XL 25 mg daily, and pravastatin 10 mg daily.\"      GERD: Current medications include: none.  Notes she was on Omeprazole in past, and she is unsure why it was dc'd.  Feels it helped her stomach symptoms.  Notes some reflux/heartburn, and states it can be very uncomfortable, especially if occurs towards the end of the day.    Supplements: Not currently on supplements.  Notes that she is now eating all of her meals in her room for the last week (previously was only eating bkfst in her room), and she feels that her bowels have improved since doing this.  Complains of diarrhea previously that would happen quickly after eating a meal in the dining room, and would sometimes not make it back to her room on time to use the restroom.  For breakfast will typically have toast, cereal, a small orange/lisa, frozen dinner for lunch, and makes herself dinner.  I do not see a vitamin D level in Epic.  Has milk on cereal in the morning and a glass of warm 2% milk typically before bed.    Constipation/Diarrhea: As noted above, pt has complained of loose stools that have \"firmed up\" somewhat with no longer eating in the facility dining room.  Current meds include Docusate 100mg twice daily.  She notes that [facility] to coming to [facility] she only took this as needed.  Wonders if it could be reduced.    Gout: Currently taking allopurinol . Pt reports no current pain concerns. Pt is experiencing the following potential medication side effects: diarrhea.   No results found for: URIC]    Polymyalgia Rheumatica/Pain: Current medication includes Tylenol 1000mg tid prn, Prednisone 10mg daily in the a.m.  Notes she has taken the Prednisone for years.  Prn Tylenol effective when used.  Reports h/o back surgery, r shoulder pain.  Receives injection for shoulder pain.  Denies pain today.  Notes sleeping well; goes to bed late, awakes early.    Today's Vitals: There were no vitals taken for this " visit.    Est CrCl ([place]ro[place]-Gault) = 48ml/min (using Scr 0.66 and adjusted body weight 54.1kg)    ASSESSMENT:                             Current medications were reviewed today as discussed above.     Medication Adherence: good, no issues identified.    Afib/HTN/CAD/HFrEF/Hyperlipidemia:  BP goal <150/90mmHg reasonable for this very elderly patient at risk for hypotension, dizziness, orthostasis, falls, tissue/cerebral hypoperfusion with too tight control.  Appears BPs well below this.  Has appt with cardiology coming up and may benefit from discussion regarding potential reduction in Lisinopril dose or d'c altogether & follow-up BMP (pt not on KCl, but on Lasix; Lisinopril is K sparing).  Son's preference is to d'c.  Encouraged patient and son to discuss with cardiology if reasonable to d'c Plavix at her next visit as they have planned due to significant bruising of arms/risk of bleed with combo, and will have been on Plavix >6mo at time of appt.     GERD: Hgb in low 10s and combo Warfarin and Plavix may be contributing.  If continues on Plavix/dual treatment, may be of benefit to consider addition of Pantoprazole for gi protection.  I would favor d'c of Plavix due to above, however, vs continuation.  As noted, pt and family to discuss with cardiology.    Supplements: May benefit from checking vitamin D level.  Pt complains of weakness, is a fall risk.  Could very likely be low in vitamin D and may benefit from addition of supplement if level <30.      Constipation: May benefit from d'c of Docusate due to complaints of loose stools.  Tends to be a minimally effective med.  Instead consider prn Senna to have available.    Gout: Possible that Allopurinol is contributing to diarrhea.   I did not discuss this with patient today, but son and I discussed and due to unclear diagnosis of gout in past, he would like to try d'c of Allopurinol and recheck uric acid level in a couple weeks.  Other option would be to check  uric acid and if below goal, consider d'c.  Son's preference is to d'c and recheck UA.  He states he will discuss this with patient and he voices that she may be more agreeable to potential d'c if she knows it may be contributing to the diarrhea.    Polymyalgia Rheumatica/Pain: Stable.     PLAN:                            1.  Please discuss with cardiology at your October appointment if ok to stop Plavix (due to significant arm bruising, increased risk of bleed in combo with Warfarin, and duration of Plavix use at that time will be >6months).    2.  Please discuss with cardiology if ok to stop Lisinopril due to low blood pressures and follow up basic metabolic panel in 2 weeks from change.  Do not want too tight control due to increased risk of dizziness and falls.    3.  Nurse practitioner may consider checking a vitamin D level.  If <30, consider supplementation.  May help with weakness, fall/fracture prevention.    4.  Nurse practitioner may consider stopping Docusate and have Senna 1 tab daily as needed available for constipation.  Please monitor if loose stools/diarrhea improves.    5.  Nurse practitioner may consider stopping Allopurinol and follow-up uric acid in 2 weeks.  Allopurinol may also contribute to diarrhea.    I spent 90 minutes with this patient today. A copy of the visit note was provided to the patient's primary care provider.    Will follow up in 3-6 months, sooner if necessary.    The patient was mailed a summary of these recommendations as an after visit summary.     Anna Sanchez, Pharm.D.,[facility] [place]  Board Certified Geriatric Pharmacist  Medication Therapy Management Pharmacist  [phone]

## 2019-09-06 DIAGNOSIS — I25.10 CORONARY ARTERY DISEASE INVOLVING NATIVE HEART WITHOUT ANGINA PECTORIS, UNSPECIFIED VESSEL OR LESION TYPE: Primary | ICD-10-CM

## 2019-09-06 NOTE — PATIENT INSTRUCTIONS
Recommendations from today's MTM visit:                                                    MTM (medication therapy management) is a service provided by a clinical pharmacist designed to help you get the most of out of your medicines.   Today we reviewed what your medicines are for, how to know if they are working, that your medicines are safe and how to make your medicine regimen as easy as possible.     1.  Please discuss with cardiology at your October appointment if ok to stop Plavix (due to significant arm bruising, increased risk of bleed in combo with Warfarin, and duration of Plavix use at that time will be >6months).    2.  Please discuss with cardiology if ok to stop Lisinopril due to low blood pressures and follow-up basic metabolic panel in 2 weeks from change.  Do not want too tight control due to increased risk of dizziness and falls.    3.  Nurse practitioner may consider checking a vitamin D level.  If <30, consider supplementation.  May help with weakness, fall/fracture prevention.    4.  Nurse practitioner may consider stopping Docusate and have Senna 1 tab daily as needed available for constipation.  Please monitor if loose stools/diarrhea improves.    5.  Nurse practitioner may consider stopping Allopurinol and follow-up uric acid in 2 weeks.  Allopurinol may also contribute to diarrhea.    It was great to speak with you today.  I value your experience and would be very thankful for your time with providing feedback on our clinic survey. You may receive a survey via email or text message in the next few days.     Next MTM visit: 3-6 months, sooner if necessary    To schedule another MTM appointment, please call me directly at [phone].    My Clinical Pharmacist's contact information:                                                      It was a pleasure talking with you today!  Please feel free to contact me with any questions or concerns you have.      Anna Sanchez, Pharm.D.,[facility] [place]  Board  Certified Geriatric Pharmacist  Medication Therapy Management Pharmacist  [phone]

## 2019-09-07 RX ORDER — NITROGLYCERIN 0.4 MG/1
0.4 TABLET SUBLINGUAL EVERY 5 MIN PRN
Qty: 25 TABLET | Refills: 98 | Status: SHIPPED | OUTPATIENT
Start: 2019-09-07 | End: 2019-10-11

## 2019-09-10 ENCOUNTER — RECORDS - HEALTHEAST (OUTPATIENT)
Dept: LAB | Facility: CLINIC | Age: 84
End: 2019-09-10

## 2019-09-10 LAB — INR PPP: 2.91 (ref 0.9–1.1)

## 2019-09-17 ENCOUNTER — RECORDS - HEALTHEAST (OUTPATIENT)
Dept: LAB | Facility: CLINIC | Age: 84
End: 2019-09-17

## 2019-09-17 ENCOUNTER — TRANSFERRED RECORDS (OUTPATIENT)
Dept: HEALTH INFORMATION MANAGEMENT | Facility: CLINIC | Age: 84
End: 2019-09-17

## 2019-09-17 LAB — INR PPP: 4.13 (ref 0.9–1.1)

## 2019-09-18 LAB — 25(OH)D3 SERPL-MCNC: 16.5 NG/ML (ref 30–80)

## 2019-09-23 NOTE — PROGRESS NOTES
[facility]  [facility] Record Number:  [id number]  Place of Service where encounter took place:  [facility] ([facility]) [[id number]]  Chief Complaint   Patient presents with     RECHECK     HPI:    Gladys Garrido  is a 89 year old (4/24/1929), who is being seen today for an episodic care visit.  HPI information obtained from: facility chart records, facility staff, patient report and [facility] chart review. Today's concern is:    Chronic Atrial Fibrillation on Anticoagulation. INR 4.08 on 9/25/19. Previous INR 4.13 on 9/17/19. On-call MD ordered Warfarin 4 mg daily. Previous INR and Warfarin dosing has been as follows:    INR 2.91 on 9/10/19 Warfarin 2.5 mg on M, 5 mg on all other days  INR 2.67 on 8/26/19 Warfarn 2.5 mg on M, F, 5 mg all other days  INR 2.25 on 8/16/19 Warfarin 2.5 mg on M, F, 5 mg all other days    Limited heart rates available to review in the month of September, in the 60s.     Of note, [place]. Staff reports they will not be able to get new prescription for Coumadin prior to patient's departure. Have limited Warfarin medications available on hand.    Gout. Allopurinol discontinued 9/11/19 after patient and son's discussion with [facility] Pharmacist. Son requested Uric Acid be repeated. On 9/24/19, Uric Acid 7.5.     Polymyalgia Rheumatica. Seen by [facility] Ortho PA-C on 8/22/19 for right shoulder pain. X-ray reviewed from 6/6/19. 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 Discussed increase in Prednisone as well as applying topical Voltaren gel. Patient wanted to think about options.. Today, patient reports she does not recall being asked to increase Prednisone for shoulder pain. States she would have agreed to it. When asked if she wanted to increase Prednisone today, states she does not like all the weight she gains from  "Prednisone, particularly on higher doses. Gained 86 lbs the last time she was on it. Then reports she was told she could buy the gel over the counter. Does not want to order it through the [facility] pharmacy as it is more expensive.     Vitamin D Deficiency. Vitamin D level 16.5 on 9/17/19. Started on Vitamin D 1000 mg PO every day by on-call NP.     Ischemic Cardiomyopathy with EF 45-50% on 4/24/19. Denies shortness of breath. Feels swelling in her legs has improved and reports shoes \"flop around\" in the morning. Upon review of weights, 154.2 lbs on 5/15/19 -> 147.8 lbs on 7/24/19 -> 143.8 lbs on 8/28/19 -> 130 lbs on 9/24/19. Discharge hospital weight 145 lbs on 5/5/19 and 3/26/19     Loose Stools. Communicated by son on 9/18/19. Reported patient having loose stools when at [facility] and noted to be isolating herself as a result. Upon review of documentation, requested Colace PRN on 9/15/19, 9/21/19 - 9/23/19. Today, patient reports the food in the dining room has caused her to have loose stools. Liked to \"pile on the greens\". Feels the meals she is making for herself in her apartment are better for her.     Tearfulness. Reports she is going to her cabin today and is looking forward to spending time with her children. States the \"only thing is Ronal won't be there\", whom is her  who passed away. Said her and her  purchased a small resort and each of her children have a cabin. Goes on to show pictures of her grandchildren. Then states she has a great family and starts to cry. Per communication with son on 9/18/19, concerned patient is showing signs of depression and wondering if there is a mental health provider that comes to the building.     Past Medical and Surgical History reviewed in Epic today.    MEDICATIONS:  Current Outpatient Medications   Medication Sig Dispense Refill     acetaminophen (ACETAMINOPHEN EXTRA STRENGTH) 500 MG tablet Take 2 tablets (1,000 mg) by mouth 3 times daily as needed for mild pain " 28 tablet 97     clopidogrel (PLAVIX) 75 MG tablet TAKE 1 TABLET BY MOUTH ONCE DAILY 31 tablet 98     docusate sodium (COLACE) 100 MG capsule Take 1 capsule (100 mg) by mouth 2 times daily as needed       furosemide (LASIX) 20 MG tablet TAKE 1 TABLET BY MOUTH ONCE DAILY 31 tablet 98     metoprolol succinate ER (TOPROL-XL) 25 MG 24 hr tablet TAKE 1 TABLET BY MOUTH ONCE DAILY 31 tablet 98     nitroGLYcerin (NITROSTAT) 0.4 MG sublingual tablet Place 1 tablet (0.4 mg) under the tongue every 5 minutes as needed for chest pain For chest pain place 1 tablet under the tongue every 5 minutes for 3 doses. If symptoms persist 5 minutes after 1st dose call 911. 25 tablet 98     pravastatin (PRAVACHOL) 10 MG tablet TAKE 1 TABLET BY MOUTH ONCE DAILY 31 tablet 98     predniSONE (DELTASONE) 10 MG tablet TAKE 1 TABLET BY MOUTH ONCE DAILY 31 tablet 98     vitamin D3 (CHOLECALCIFEROL) 1000 units (25 mcg) tablet Take 1,000 Units by mouth daily       warfarin (COUMADIN) 2.5 MG tablet Take by mouth daily Contact [facility] facility for dosing instructions       REVIEW OF SYSTEMS:  4 point ROS including Respiratory, CV, GI and , other than that noted in the HPI,  is negative    Objective:  /84   Pulse 63   Temp 98.1  F (36.7  C)   Resp 20   Wt 59 kg (130 lb)   Exam:  GENERAL APPEARANCE:  Alert, in no distress  ENT:  Mouth and posterior oropharynx normal, moist mucous membranes  EYES:  EOM, conjunctivae, lids, pupils and irises normal  RESP:  respiratory effort and palpation of chest normal, lungs clear to auscultation , no respiratory distress  CV:  Palpation and auscultation of heart done , regular rate and rhythm, no murmur, rub, or gallop  ABDOMEN:  normal bowel sounds, soft, nontender, no hepatosplenomegaly or other masses  M/S:   Active movement of bilateral upper and lower extremities. 1+ edema in BLE.  SKIN: Inspection and palpation at baseline. Stasis dermatitis on BLE.   NEURO:   Cranial nerves 2-12 are normal tested and  grossly at patient's baseline  PSYCH:  Tearful at times    Labs:   Labs done in SNF are in [facility] EPIC. Please refer to them using [employer] Everywhere.    ASSESSMENT/PLAN:  Chronic Atrial Fibrillation on Anticoagulation. INR supratherapeutic. Question if related to diet. Patient reports eating salads at mealtime in the main dining room, but now cooking for herself. Has had episodes of loose stools, but this has since resolved. Contacted son, Ceferino, via telephone. Reviewed INR results and Warfarin available on hand at facility for patient to take with her to the cabin. Will hold Warfarin on 9/25/19 and 9/26/19. Warfarin 2.5 mg on 9/27/19 and 9/29/19. Warfarin 5 mg on 9/28/19. Repeat INR on 9/30/19. Also taking Metoprolol.     Chronic Gout. Discontinued Allopurinol on 9/11/19 per [facility] pharmacist's recommendation. Uric Acid level WNL.      Polymyalgia Rheumatica. Son and patient request Sed Rate be monitored to determine Prednisone dosing, 61 on 5/30/19 and 21 on 8/16/19. Re-initiated on Prednisone 10 mg on 6/4/19. Seen by [facility] Alyssa RAMIREZ on 8/22/19 and recommended increase in Prednisone which patient declines today due to issues with weight gain in the past. Does not want Voltaren gel ordered from [facility] pharmacy as the price is higher than retail. Will purchase at a store if she is interested in using in the future. Has Acetaminophen available as needed.    Vitamin D Deficiency. Vitamin D level 16.5 on 9/17/19. Started on Vitamin D 1000 mg PO every day by on-call NP. Recheck Vitamin D level in 8-12 weeks to ensure upward trend.    Ischemic Cardiomyopathy with EF 45-50% on 4/24/19. Appears euvolemic on exam. Last seen by Cardiology on 7/8/19 with recommendations for repeat echocardiogram in 3-6 months. Son has arranged in October. Weights trending down, but would question accuracy of most recent weight. Continue to monitor monthly. Taking Furosemide and Metoprolol.     Loose Stools. Docusate Sodium changed from scheduled  to as needed on 9/11/19. Patient noted to have loose stools last week, but was also requesting Docusate Sodium PRN. Son states he will review when to use medication with patient over the weekend. Patient also notes resolution in loose stools since she began cooking for herself and not eating in the main dining room.     Tearfulness.Noted during most visits with patient. Has declined medications. Looks forward to being at the cabin, which she will do this weekend. Continue to encourage participation in groups at the [facility] facility.     Electronically signed by:  MIRZA Kwan CNP

## 2019-09-24 ENCOUNTER — RECORDS - HEALTHEAST (OUTPATIENT)
Dept: LAB | Facility: CLINIC | Age: 84
End: 2019-09-24

## 2019-09-24 ENCOUNTER — TRANSFERRED RECORDS (OUTPATIENT)
Dept: HEALTH INFORMATION MANAGEMENT | Facility: CLINIC | Age: 84
End: 2019-09-24

## 2019-09-24 LAB — URATE SERPL-MCNC: 7.5 MG/DL (ref 2–7.5)

## 2019-09-25 ENCOUNTER — ASSISTED LIVING VISIT (OUTPATIENT)
Dept: GERIATRICS | Facility: CLINIC | Age: 84
End: 2019-09-25
Payer: MEDICARE

## 2019-09-25 ENCOUNTER — RECORDS - HEALTHEAST (OUTPATIENT)
Dept: LAB | Facility: CLINIC | Age: 84
End: 2019-09-25

## 2019-09-25 VITALS
WEIGHT: 130 LBS | DIASTOLIC BLOOD PRESSURE: 84 MMHG | HEART RATE: 63 BPM | RESPIRATION RATE: 20 BRPM | SYSTOLIC BLOOD PRESSURE: 112 MMHG | TEMPERATURE: 98.1 F

## 2019-09-25 DIAGNOSIS — K59.00 CONSTIPATION, UNSPECIFIED CONSTIPATION TYPE: ICD-10-CM

## 2019-09-25 DIAGNOSIS — R45.89 TEARFULNESS: ICD-10-CM

## 2019-09-25 DIAGNOSIS — I48.20 CHRONIC ATRIAL FIBRILLATION (H): Primary | ICD-10-CM

## 2019-09-25 DIAGNOSIS — M1A.9XX0 CHRONIC GOUT WITHOUT TOPHUS, UNSPECIFIED CAUSE, UNSPECIFIED SITE: ICD-10-CM

## 2019-09-25 DIAGNOSIS — I25.5 ISCHEMIC CARDIOMYOPATHY: ICD-10-CM

## 2019-09-25 DIAGNOSIS — M35.3 POLYMYALGIA RHEUMATICA (H): ICD-10-CM

## 2019-09-25 DIAGNOSIS — E55.9 VITAMIN D DEFICIENCY: ICD-10-CM

## 2019-09-25 DIAGNOSIS — R19.5 LOOSE STOOLS: ICD-10-CM

## 2019-09-25 LAB — INR PPP: 4.08 (ref 0.9–1.1)

## 2019-09-25 RX ORDER — DOCUSATE SODIUM 100 MG/1
100 CAPSULE, LIQUID FILLED ORAL 2 TIMES DAILY PRN
Start: 2019-09-25

## 2019-09-25 NOTE — LETTER
9/25/2019        RE: Gladys Garrido  [facility]  [address]  [facility] [address]        [place] [facility]  [place] Medical Record Number:  [id number]  Place of Service where encounter took place:  [facility] ([facility]) [[id number]]  Chief Complaint   Patient presents with     RECHECK     HPI:    Gladys Garrido  is a 89 year old (4/24/1929), who is being seen today for an episodic care visit.  HPI information obtained from: facility chart records, facility staff, patient report and [facility] chart review. Today's concern is:    Chronic Atrial Fibrillation on Anticoagulation. INR 4.08 on 9/25/19. Previous INR 4.13 on 9/17/19. On-call MD ordered Warfarin 4 mg daily. Previous INR and Warfarin dosing has been as follows:    INR 2.91 on 9/10/19 Warfarin 2.5 mg on M, 5 mg on all other days  INR 2.67 on 8/26/19 Warfarn 2.5 mg on M, F, 5 mg all other days  INR 2.25 on 8/16/19 Warfarin 2.5 mg on M, F, 5 mg all other days    Limited heart rates available to review in the month of September, in the 60s.     Of note, [place]. Staff reports they will not be able to get new prescription for Coumadin prior to patient's departure. Have limited Warfarin medications available on hand.    Gout. Allopurinol discontinued 9/11/19 after patient and son's discussion with [facility] Pharmacist. Son requested Uric Acid be repeated. On 9/24/19, Uric Acid 7.5.     Polymyalgia Rheumatica. Seen by [facility] Ortho PA-C on 8/22/19 for right shoulder pain. X-ray reviewed from 6/6/19. 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 Discussed increase in Prednisone as well as applying topical Voltaren gel. Patient wanted to think about options.. Today, patient reports she does not recall being asked to increase Prednisone for shoulder pain. States she would have agreed to it. When asked  "if she wanted to increase Prednisone today, states she does not like all the weight she gains from Prednisone, particularly on higher doses. Gained 86 lbs the last time she was on it. Then reports she was told she could buy the gel over the counter. Does not want to order it through the [facility] pharmacy as it is more expensive.     Vitamin D Deficiency. Vitamin D level 16.5 on 9/17/19. Started on Vitamin D 1000 mg PO every day by on-call NP.     Ischemic Cardiomyopathy with EF 45-50% on 4/24/19. Denies shortness of breath. Feels swelling in her legs has improved and reports shoes \"flop around\" in the morning. Upon review of weights, 154.2 lbs on 5/15/19 -> 147.8 lbs on 7/24/19 -> 143.8 lbs on 8/28/19 -> 130 lbs on 9/24/19. Discharge hospital weight 145 lbs on 5/5/19 and 3/26/19     Loose Stools. Communicated by son on 9/18/19. Reported patient having loose stools when at [facility] and noted to be isolating herself as a result. Upon review of documentation, requested Colace PRN on 9/15/19, 9/21/19 - 9/23/19. Today, patient reports the food in the dining room has caused her to have loose stools. Liked to \"pile on the greens\". Feels the meals she is making for herself in her apartment are better for her.     Tearfulness. Reports she is going to her cabin today and is looking forward to spending time with her children. States the \"only thing is Ronal won't be there\", whom is her  who passed away. Said her and her  purchased a small resort and each of her children have a cabin. Goes on to show pictures of her grandchildren. Then states she has a great family and starts to cry. Per communication with son on 9/18/19, concerned patient is showing signs of depression and wondering if there is a mental health provider that comes to the building.     Past Medical and Surgical History reviewed in Epic today.    MEDICATIONS:  Current Outpatient Medications   Medication Sig Dispense Refill     acetaminophen (ACETAMINOPHEN " EXTRA STRENGTH) 500 MG tablet Take 2 tablets (1,000 mg) by mouth 3 times daily as needed for mild pain 28 tablet 97     clopidogrel (PLAVIX) 75 MG tablet TAKE 1 TABLET BY MOUTH ONCE DAILY 31 tablet 98     docusate sodium (COLACE) 100 MG capsule Take 1 capsule (100 mg) by mouth 2 times daily as needed       furosemide (LASIX) 20 MG tablet TAKE 1 TABLET BY MOUTH ONCE DAILY 31 tablet 98     metoprolol succinate ER (TOPROL-XL) 25 MG 24 hr tablet TAKE 1 TABLET BY MOUTH ONCE DAILY 31 tablet 98     nitroGLYcerin (NITROSTAT) 0.4 MG sublingual tablet Place 1 tablet (0.4 mg) under the tongue every 5 minutes as needed for chest pain For chest pain place 1 tablet under the tongue every 5 minutes for 3 doses. If symptoms persist 5 minutes after 1st dose call 911. 25 tablet 98     pravastatin (PRAVACHOL) 10 MG tablet TAKE 1 TABLET BY MOUTH ONCE DAILY 31 tablet 98     predniSONE (DELTASONE) 10 MG tablet TAKE 1 TABLET BY MOUTH ONCE DAILY 31 tablet 98     vitamin D3 (CHOLECALCIFEROL) 1000 units (25 mcg) tablet Take 1,000 Units by mouth daily       warfarin (COUMADIN) 2.5 MG tablet Take by mouth daily Contact [facility] facility for dosing instructions       REVIEW OF SYSTEMS:  4 point ROS including Respiratory, CV, GI and , other than that noted in the HPI,  is negative    Objective:  /84   Pulse 63   Temp 98.1  F (36.7  C)   Resp 20   Wt 59 kg (130 lb)   Exam:  GENERAL APPEARANCE:  Alert, in no distress  ENT:  Mouth and posterior oropharynx normal, moist mucous membranes  EYES:  EOM, conjunctivae, lids, pupils and irises normal  RESP:  respiratory effort and palpation of chest normal, lungs clear to auscultation , no respiratory distress  CV:  Palpation and auscultation of heart done , regular rate and rhythm, no murmur, rub, or gallop  ABDOMEN:  normal bowel sounds, soft, nontender, no hepatosplenomegaly or other masses  M/S:   Active movement of bilateral upper and lower extremities. 1+ edema in BLE.  SKIN: Inspection and  palpation at baseline. Stasis dermatitis on BLE.   NEURO:   Cranial nerves 2-12 are normal tested and grossly at patient's baseline  PSYCH:  Tearful at times    Labs:   Labs done in SNF are in [place] EPIC. Please refer to them using [email or web address].    ASSESSMENT/PLAN:  Chronic Atrial Fibrillation on Anticoagulation. INR supratherapeutic. Question if related to diet. Patient reports eating salads at mealtime in the main dining room, but now cooking for herself. Has had episodes of loose stools, but this has since resolved. Contacted son, Ceferino, via telephone. Reviewed INR results and Warfarin available on hand at facility for patient to take with her to the cabin. Will hold Warfarin on 9/25/19 and 9/26/19. Warfarin 2.5 mg on 9/27/19 and 9/29/19. Warfarin 5 mg on 9/28/19. Repeat INR on 9/30/19. Also taking Metoprolol.     Chronic Gout. Discontinued Allopurinol on 9/11/19 per [facility] pharmacist's recommendation. Uric Acid level WNL.      Polymyalgia Rheumatica. Son and patient request Sed Rate be monitored to determine Prednisone dosing, 61 on 5/30/19 and 21 on 8/16/19. Re-initiated on Prednisone 10 mg on 6/4/19. Seen by [facility] Alyssa RAMIREZ on 8/22/19 and recommended increase in Prednisone which patient declines today due to issues with weight gain in the past. Does not want Voltaren gel ordered from [facility] pharmacy as the price is higher than retail. Will purchase at a store if she is interested in using in the future. Has Acetaminophen available as needed.    Vitamin D Deficiency. Vitamin D level 16.5 on 9/17/19. Started on Vitamin D 1000 mg PO every day by on-call NP. Recheck Vitamin D level in 8-12 weeks to ensure upward trend.    Ischemic Cardiomyopathy with EF 45-50% on 4/24/19. Appears euvolemic on exam. Last seen by Cardiology on 7/8/19 with recommendations for repeat echocardiogram in 3-6 months. Son has arranged in October. Weights trending down, but would question accuracy of most recent weight. Continue to  monitor monthly. Taking Furosemide and Metoprolol.     Loose Stools. Docusate Sodium changed from scheduled to as needed on 9/11/19. Patient noted to have loose stools last week, but was also requesting Docusate Sodium PRN. Son states he will review when to use medication with patient over the weekend. Patient also notes resolution in loose stools since she began cooking for herself and not eating in the main dining room.     Tearfulness.Noted during most visits with patient. Has declined medications. Looks forward to being at the cabin, which she will do this weekend. Continue to encourage participation in groups at the [facility] facility.     Electronically signed by:  MIRZA Kwan CNP             Sincerely,        MIRZA Kwan CNP

## 2019-09-29 DIAGNOSIS — E55.9 VITAMIN D DEFICIENCY: Primary | ICD-10-CM

## 2019-09-30 ENCOUNTER — RECORDS - HEALTHEAST (OUTPATIENT)
Dept: LAB | Facility: CLINIC | Age: 84
End: 2019-09-30

## 2019-09-30 LAB — INR PPP: 1.31 (ref 0.9–1.1)

## 2019-09-30 RX ORDER — CHOLECALCIFEROL (VITAMIN D3) 25 MCG
TABLET ORAL
Qty: 31 TABLET | Refills: 98 | Status: SHIPPED | OUTPATIENT
Start: 2019-09-30

## 2019-10-01 ENCOUNTER — APPOINTMENT (OUTPATIENT)
Age: 84
Setting detail: DERMATOLOGY
End: 2019-10-08

## 2019-10-01 DIAGNOSIS — Z71.89 OTHER SPECIFIED COUNSELING: ICD-10-CM

## 2019-10-01 DIAGNOSIS — Z85.828 PERSONAL HISTORY OF OTHER MALIGNANT NEOPLASM OF SKIN: ICD-10-CM

## 2019-10-01 DIAGNOSIS — L82.1 OTHER SEBORRHEIC KERATOSIS: ICD-10-CM

## 2019-10-01 PROBLEM — C44.319 BASAL CELL CARCINOMA OF SKIN OF OTHER PARTS OF FACE: Status: ACTIVE | Noted: 2019-10-01

## 2019-10-01 PROCEDURE — 99202 OFFICE O/P NEW SF 15 MIN: CPT | Mod: 25

## 2019-10-01 PROCEDURE — 14041 TIS TRNFR F/C/C/M/N/A/G/H/F: CPT

## 2019-10-01 PROCEDURE — 17312 MOHS ADDL STAGE: CPT

## 2019-10-01 PROCEDURE — 17311 MOHS 1 STAGE H/N/HF/G: CPT

## 2019-10-01 PROCEDURE — OTHER RETURN TO REFERRING PROVIDER: OTHER

## 2019-10-01 PROCEDURE — OTHER MIPS QUALITY: OTHER

## 2019-10-01 PROCEDURE — OTHER COUNSELING: OTHER

## 2019-10-01 PROCEDURE — OTHER MOHS SURGERY: OTHER

## 2019-10-01 ASSESSMENT — LOCATION SIMPLE DESCRIPTION DERM
LOCATION SIMPLE: RIGHT SHOULDER
LOCATION SIMPLE: LEFT NOSE
LOCATION SIMPLE: NOSE

## 2019-10-01 ASSESSMENT — LOCATION DETAILED DESCRIPTION DERM
LOCATION DETAILED: LEFT NASAL SIDEWALL
LOCATION DETAILED: NASAL ROOT
LOCATION DETAILED: RIGHT POSTERIOR SHOULDER

## 2019-10-01 ASSESSMENT — LOCATION ZONE DERM
LOCATION ZONE: NOSE
LOCATION ZONE: ARM

## 2019-10-01 NOTE — PROCEDURE: MIPS QUALITY
Quality 474: Zoster Vaccination Status: Shingrix Vaccination not Administered or Previously Received, Reason not Otherwise Specified
Detail Level: Detailed
Quality 143: Oncology: Medical And Radiation- Pain Intensity Quantified: Pain severity quantified, no pain present
Quality 226: Preventive Care And Screening: Tobacco Use: Screening And Cessation Intervention: Patient screened for tobacco and never smoked
Quality 130: Documentation Of Current Medications In The Medical Record: Current Medications Documented
Quality 110: Preventive Care And Screening: Influenza Immunization: Influenza Immunization previously received during influenza season
Quality 431: Preventive Care And Screening: Unhealthy Alcohol Use - Screening: Patient screened for unhealthy alcohol use using a single question and scores less than 2 times per year

## 2019-10-01 NOTE — PROCEDURE: MOHS SURGERY
Addended by: Milton Purdy on: 4/30/2018 06:23 PM     Modules accepted: Orders Where Do You Want The Question To Include Opioid Counseling Located?: Case Summary Tab

## 2019-10-01 NOTE — PROCEDURE: MOHS SURGERY
Imiquimod Counseling:  I discussed with the patient the risks of imiquimod including but not limited to erythema, scaling, itching, weeping, crusting, and pain.  Patient understands that the inflammatory response to imiquimod is variable from person to person and was educated regarded proper titration schedule.  If flu-like symptoms develop, patient knows to discontinue the medication and contact us. Post-Care Instructions: I reviewed with the patient in detail post-care instructions. Patient is not to engage in any heavy lifting, exercise, or swimming for the next 14 days. Should the patient develop any fevers, chills, bleeding, severe pain patient will contact the office immediately.

## 2019-10-03 ENCOUNTER — RECORDS - HEALTHEAST (OUTPATIENT)
Dept: LAB | Facility: CLINIC | Age: 84
End: 2019-10-03

## 2019-10-03 ENCOUNTER — TELEPHONE (OUTPATIENT)
Dept: GERIATRICS | Facility: CLINIC | Age: 84
End: 2019-10-03

## 2019-10-03 LAB — INR PPP: 1.47 (ref 0.9–1.1)

## 2019-10-04 ENCOUNTER — TELEPHONE (OUTPATIENT)
Dept: GERIATRICS | Facility: CLINIC | Age: 84
End: 2019-10-04

## 2019-10-04 NOTE — TELEPHONE ENCOUNTER
Called by nursing staff at [facility] about pt's INR.   Today 1.47   Pt had a surgical procedure on 9/26, warfarin held 9/24 and 25.   However, pt went out of town with her family and warfarin not restarted until 10/1   Had 5 mg on 10/1 and 10/2      PLAN:  Warfarin 5 mg tonight, then 2.5 mg nightly   INR next Monday or Tuesday when lab available.     Sanam Oscar MD

## 2019-10-07 ENCOUNTER — RECORDS - HEALTHEAST (OUTPATIENT)
Dept: LAB | Facility: CLINIC | Age: 84
End: 2019-10-07

## 2019-10-07 LAB — INR PPP: 2.19 (ref 0.9–1.1)

## 2019-10-08 ENCOUNTER — TELEPHONE (OUTPATIENT)
Dept: GERIATRICS | Facility: CLINIC | Age: 84
End: 2019-10-08

## 2019-10-10 ENCOUNTER — RECORDS - HEALTHEAST (OUTPATIENT)
Dept: LAB | Facility: CLINIC | Age: 84
End: 2019-10-10

## 2019-10-10 ENCOUNTER — TRANSFERRED RECORDS (OUTPATIENT)
Dept: HEALTH INFORMATION MANAGEMENT | Facility: CLINIC | Age: 84
End: 2019-10-10

## 2019-10-10 LAB
INR PPP: 1.84 (ref 0.9–1.1)
INR PPP: 1.84 (ref 0.9–1.1)

## 2019-10-11 DIAGNOSIS — I25.10 CORONARY ARTERY DISEASE INVOLVING NATIVE HEART WITHOUT ANGINA PECTORIS, UNSPECIFIED VESSEL OR LESION TYPE: ICD-10-CM

## 2019-10-11 RX ORDER — NITROGLYCERIN 0.4 MG/1
TABLET SUBLINGUAL
Qty: 25 TABLET | Refills: 98 | Status: SHIPPED | OUTPATIENT
Start: 2019-10-11

## 2019-10-14 ENCOUNTER — RECORDS - HEALTHEAST (OUTPATIENT)
Dept: LAB | Facility: CLINIC | Age: 84
End: 2019-10-14

## 2019-10-14 LAB — INR PPP: 2.29 (ref 0.9–1.1)

## 2019-10-17 ENCOUNTER — RECORDS - HEALTHEAST (OUTPATIENT)
Dept: LAB | Facility: CLINIC | Age: 84
End: 2019-10-17

## 2019-10-17 LAB — INR PPP: 1.83 (ref 0.9–1.1)

## 2019-10-21 ENCOUNTER — RECORDS - HEALTHEAST (OUTPATIENT)
Dept: LAB | Facility: CLINIC | Age: 84
End: 2019-10-21

## 2019-10-21 LAB — INR PPP: 2.58 (ref 0.9–1.1)

## 2019-10-24 ENCOUNTER — TRANSFERRED RECORDS (OUTPATIENT)
Dept: HEALTH INFORMATION MANAGEMENT | Facility: CLINIC | Age: 84
End: 2019-10-24

## 2019-10-24 ENCOUNTER — RECORDS - HEALTHEAST (OUTPATIENT)
Dept: LAB | Facility: CLINIC | Age: 84
End: 2019-10-24

## 2019-10-24 LAB
INR PPP: 2.55 (ref 0.9–1.1)
INR PPP: 2.55 (ref 0.9–1.1)

## 2019-10-29 ENCOUNTER — RECORDS - HEALTHEAST (OUTPATIENT)
Dept: LAB | Facility: CLINIC | Age: 84
End: 2019-10-29

## 2019-10-29 ENCOUNTER — TRANSFERRED RECORDS (OUTPATIENT)
Dept: HEALTH INFORMATION MANAGEMENT | Facility: CLINIC | Age: 84
End: 2019-10-29

## 2019-10-29 VITALS
HEIGHT: 60 IN | RESPIRATION RATE: 19 BRPM | SYSTOLIC BLOOD PRESSURE: 99 MMHG | HEART RATE: 91 BPM | TEMPERATURE: 98.2 F | WEIGHT: 142 LBS | BODY MASS INDEX: 27.88 KG/M2 | DIASTOLIC BLOOD PRESSURE: 53 MMHG

## 2019-10-29 PROBLEM — I25.5 ISCHEMIC CARDIOMYOPATHY: Status: ACTIVE | Noted: 2019-07-08

## 2019-10-29 PROBLEM — I25.10 CORONARY ARTERY DISEASE INVOLVING NATIVE CORONARY ARTERY OF NATIVE HEART WITHOUT ANGINA PECTORIS: Status: ACTIVE | Noted: 2019-04-04

## 2019-10-29 PROBLEM — I48.91 A-FIB (H): Status: ACTIVE | Noted: 2017-11-29

## 2019-10-29 PROBLEM — M12.9 ARTHRITIS INVOLVING MULTIPLE SITES: Status: ACTIVE | Noted: 2019-05-13

## 2019-10-29 PROBLEM — I50.22 CHRONIC SYSTOLIC CHF (CONGESTIVE HEART FAILURE) (H): Status: ACTIVE | Noted: 2019-04-04

## 2019-10-29 PROBLEM — K59.01 SLOW TRANSIT CONSTIPATION: Status: ACTIVE | Noted: 2017-12-02

## 2019-10-29 PROBLEM — R79.89 ELEVATED TROPONIN: Status: ACTIVE | Noted: 2019-05-05

## 2019-10-29 PROBLEM — I50.23 ACUTE ON CHRONIC SYSTOLIC HEART FAILURE (H): Status: ACTIVE | Noted: 2019-03-29

## 2019-10-29 PROBLEM — M10.9 GOUT: Status: ACTIVE | Noted: 2019-05-13

## 2019-10-29 PROBLEM — R07.9 CHEST PAIN: Status: ACTIVE | Noted: 2019-04-04

## 2019-10-29 PROBLEM — Z86.79 PERSONAL HISTORY OF OTHER DISEASES OF THE CIRCULATORY SYSTEM: Status: ACTIVE | Noted: 2019-10-29

## 2019-10-29 PROBLEM — I21.3 STEMI (ST ELEVATION MYOCARDIAL INFARCTION) (H): Status: ACTIVE | Noted: 2019-03-21

## 2019-10-29 PROBLEM — R69 OTHER ILL-DEFINED AND UNKNOWN CAUSES OF MORBIDITY AND MORTALITY: Status: ACTIVE | Noted: 2019-10-29

## 2019-10-29 PROBLEM — N30.00 ACUTE CYSTITIS: Status: ACTIVE | Noted: 2019-05-04

## 2019-10-29 PROBLEM — S81.802A LEG WOUND, LEFT: Status: ACTIVE | Noted: 2019-01-14

## 2019-10-29 PROBLEM — I25.2 HISTORY OF ST ELEVATION MYOCARDIAL INFARCTION (STEMI): Status: ACTIVE | Noted: 2019-05-13

## 2019-10-29 LAB
INR PPP: 2.53 (ref 0.9–1.1)
INR PPP: 2.53 (ref 0.9–1.1)

## 2019-10-29 ASSESSMENT — MIFFLIN-ST. JEOR: SCORE: 985.61

## 2019-10-29 NOTE — PROGRESS NOTES
[place] [facility]  Chief Complaint   Patient presents with     Annual Comprehensive Exam Assisted Living     [place] Medical Record Number:  [id number]  Place of Service where encounter took place:  [facility] ([facility]) [[id number]]    HPI:    Gladys Garrido  is a 89 year old  (4/24/1929), who is being seen today for an annual comprehensive visit. HPI information obtained from: facility chart records, facility staff, patient report, [place] Epic chart review and Care Everywhere [facility] chart review.  Today's concerns are:    Coronary Artery Disease/STEMI S/P PTCA to Distal RCA/PDA/BRENDA Trifurcation/Hypertension. Denies chest pain. Upon review of blood pressures over the past month, systolic range from . Diastolic range from 53-84. Seen by Cardiology on 7/8/19. Lisinopril discontinued on 9/23/19 due to episodes of hypotension. Son, Ceferino, previously reported patient is to stay on Clopidogrel until March 2020.    Ischemic Cardiomyopathy with EF 45-50% on 10/7/19. Denies shortness of breath. Upon review of weights, 154.2 lbs on 5/15/19 -> 147.8 lbs on 7/24/19 -> 143.8 lbs on 8/28/19 -> 130 lbs on 9/24/19 -> 142 lbs on 10/29/19. Discharge hospital weight 145 lbs on 5/5/19 and 3/26/19. Last echocardiogram on 10/7/19 stable from previous echo in April 2019.      Chronic Atrial Fibrillation on Anticoagulation. INR 2.53 on 10/29/19. Taking Warfarin 2.5 mg daily. Last INR 2.55 on 10/24/19. Heart rate 63-91 over the past month.     Chronic Bilateral Lower Extremity Edema. Does not wear compression stockings.      Polymyalgia Rheumatica. Son and patient request Sed Rate be monitored to determine Prednisone dosing, 61 on 5/30/19 and 21 on 8/16/19. Re-initiated on Prednisone 10 mg on 6/4/19. Seen by [facility] Alyssa RAMIREZ on 8/22/19 and recommended increase in Prednisone which patient declined at previous visit due to issues with weight gain in the past. Does not want Voltaren gel ordered  "from [facility] pharmacy as the price is higher than retail. Will purchase at a store if she is interested in using in the future. Has Acetaminophen available as needed.      Chronic Low Back Pain. No reports of pain today.      Gout. Allopurinol discontinued 9/11/19 after patient and son's discussion with [facility] Pharmacist. Son requested Uric Acid be repeated. On 9/24/19, Uric Acid 7.5     Anemia. Last Hemoglobin 12.4 on 9/26/19.     Carotid Bruit. Noted during 7/8/19 Cardiology visit. Carotid ultrasound performed on 10/7/19 with no acute findings.    Tearfulness. Patient has had multiple visits where she is tearful related to being in the Assisted Living and not at her cabin. Does not like to be a \"bother\" to her children. Today, she is in good spirits, but is brief as she needs to use the restroom.     History of Skin Cancer. Mohs procedure on face 10/1/19.     Vitamin D Deficiency. Last Vitamin D level 16.5 on 9/17/19.     Constipation. No reports of issues with bowel movements.    ALLERGIES: Aspirin; Codeine; Penicillins; and Sulfa drugs  PAST MEDICAL HISTORY:  has a past medical history of A-fib (H) (11/29/2017), Blue baby, Chronic systolic CHF (congestive heart failure) (H) (04/04/2019), Concussion, Coronary artery disease involving native coronary artery of native heart without angina pectoris (04/04/2019), GERD (gastroesophageal reflux disease), History of vertebral fracture (2010), HLD (hyperlipidemia), HTN (hypertension), Lymphedema, and NSTEMI (non-ST elevated myocardial infarction) (H).  PAST SURGICAL HISTORY:  has a past surgical history that includes Arthroplasty knee; Cholecystectomy; appendectomy; Endoscopic stripping vein(s); tonsillectomy; and Insert picc line.  IMMUNIZATIONS:  Immunization History   Administered Date(s) Administered     DTaP, Unspecified 11/01/2018     Flu, Unspecified 10/20/2015     Influenza (High Dose) 3 valent vaccine 11/11/2013, 11/11/2013, 09/26/2017, 09/26/2017, 09/04/2018, " 09/04/2018     Influenza (IIV3) PF 01/11/2013     Influenza Vaccine IM > 6 months Valent IIV4 11/25/2014     Influenza Vaccine, 3 YRS +, IM (QUADRIVALENT W/PRESERVATIVES) 11/11/2013, 11/25/2014, 10/20/2015, 09/26/2017     Pneumo Conj 13-V (2010&after) 11/30/2015     Pneumococcal 23 valent 11/11/2013     Tdap (Adult) Unspecified Formulation 11/01/2018     Above immunizations pulled from [facility]. [facility] and facility records also reconciled. Outstanding information sent to  to update [facility].  Future immunizations are not needed at this point as all recommended immunizations are up to date.     Current Outpatient Medications   Medication Sig Dispense Refill     acetaminophen (ACETAMINOPHEN EXTRA STRENGTH) 500 MG tablet Take 2 tablets (1,000 mg) by mouth 3 times daily as needed for mild pain 28 tablet 97     clopidogrel (PLAVIX) 75 MG tablet TAKE 1 TABLET BY MOUTH ONCE DAILY 31 tablet 98     docusate sodium (COLACE) 100 MG capsule Take 1 capsule (100 mg) by mouth 2 times daily as needed       furosemide (LASIX) 20 MG tablet TAKE 1 TABLET BY MOUTH ONCE DAILY 31 tablet 98     metoprolol succinate ER (TOPROL-XL) 25 MG 24 hr tablet TAKE 1 TABLET BY MOUTH ONCE DAILY 31 tablet 98     nitroGLYcerin (NITROSTAT) 0.4 MG sublingual tablet PLACE 1 TABLET UNDER TONGUE AT ONSET OF CHEST PAIN. MAY REPEAT EVERY 5 MINUTES AS NEEDED X 3 DOSES 25 tablet 98     pravastatin (PRAVACHOL) 10 MG tablet TAKE 1 TABLET BY MOUTH ONCE DAILY 31 tablet 98     predniSONE (DELTASONE) 10 MG tablet TAKE 1 TABLET BY MOUTH ONCE DAILY 31 tablet 98     VITAMIN D3 1000 units tablet TAKE 1 TABLET BY MOUTH ONCE DAILY 31 tablet 98     warfarin (COUMADIN) 2.5 MG tablet Take by mouth daily Contact [facility] facility for dosing instructions       Case Management:  I have reviewed the Assisted Living care plan, current immunizations and preventive care/cancer screening. .Future cancer screening is not clinically indicated secondary to age/goals of  care Patient's desire to return to the community is present, but is not able due to care needs . Current Level of Care is appropriate.    Advance Directive Discussion:    I reviewed the current advanced directives as reflected in EPIC, the POLST and the facility chart, and verified the congruency of orders. I did review the advance directives with the resident.     Team Discussion:  I communicated with the appropriate disciplines involved with the Plan of Care:   Nursing    Patient's goal is pain control and comfort.  Information reviewed:  Medications, vital signs, orders, and nursing notes.    ROS:  4 point ROS including Respiratory, CV, GI and , other than that noted in the HPI,  is negative    Vitals:  BP 99/53   Pulse 91   Temp 98.2  F (36.8  C)   Resp 19   Ht 1.524 m (5')   Wt 64.4 kg (142 lb)   BMI 27.73 kg/m   Body mass index is 27.73 kg/m .  Exam:  GENERAL APPEARANCE:  Alert, in no distress  ENT:  Mouth and posterior oropharynx normal, moist mucous membranes  EYES:  EOM, conjunctivae, lids, pupils and irises normal  RESP:  respiratory effort and palpation of chest normal, lungs clear to auscultation, no respiratory distress  CV:  Palpation and auscultation of heart done , regular rate and rhythm, no rub or gallop  ABDOMEN:  normal bowel sounds, soft, nontender, no hepatosplenomegaly or other masses  M/S:   Active movement of bilateral upper and lower extremities. 1+ edema in BLE.  SKIN: Ecchymosis noted slightly below right eye. Stasis dermatitis on BLE.   NEURO:   Cranial nerves 2-12 are normal tested and grossly at patient's baseline  PSYCH:  Affect and mood WNL    Lab/Diagnostic data:   [facility]. Please refer to them using [facility]/Care Everywhere.    ASSESSMENT/PLAN:  Coronary Artery Disease/STEMI S/P PTCA to Distal RCA/PDA/BRENDA Trifurcation/Hypertension. Followed by [facility] with follow-up due around January 2020. Monitor blood pressure closely  given episodes of hypotension. Lisinopril discontinued on 9/23/19 due to hypotension. Continue Clopidogrel until March 2020. Taking Metoprolol, Pravastatin and Nitroglycerin.    Ischemic Cardiomyopathy with EF 45-50% on 10/7/19. Appears euvolemic on exam. Last seen by Cardiology on 7/8/19. Last echocardiogram 10/7/19. Patient has scale in room. Baseline weight ~ low-to-mid 140s. Taking Furosemide and Metoprolol.     Chronic Atrial Fibrillation on Anticoagulation. INR therapeutic. Warfarin 5 mg on Fridays and Warfarin 2.5 mg ordered all other days. Repeat INR on 11/4/19. Heart rate WNL. Continue Metoprolol as ordered.    Polymyalgia Rheumatica. Son and patient request Sed Rate be monitored to determine Prednisone dosing, 61 on 5/30/19 and 21 on 8/16/19. Re-initiated on Prednisone 10 mg on 6/4/19. Seen by [facility] Alyssa RAMIREZ on 8/22/19 and recommended increase in Prednisone which patient declines today due to issues with weight gain in the past. Does not want Voltaren gel ordered from [facility] pharmacy as the price is higher than retail. Will purchase at a store if she is interested in using in the future. Has Acetaminophen available as needed.    Bilateral Lower Extremity Edema. Chronic. Encourage elevation. Uses walker to ambulate.     Chronic Low Back Pain. No reports of pain. Acetaminophen available as needed.    Chronic Gout. Discontinued Allopurinol on 9/11/19 per [facility] pharmacist's recommendation. Uric Acid level WNL    Iron Deficiency Anemia. Iron Saturation Index 14 on 5/23/19. Patient declines Iron supplementation. Last Hemoglobin 12.4 on 9/26/19. Monitor Hemoglobin periodically to ensure stability.     History of Skin Cancer. S/P Mohs procedure on face 10/1/19.     Vitamin D Deficiency. Vitamin D level 16.5 on 9/17/19. Started on Vitamin D 1000 mg PO every day by on-call NP. Recheck Vitamin D level in 8-12 weeks to ensure upward trend.    Carotid Bruit. Noted during 7/8/19 Cardiology visit. Carotid ultrasound performed  on 10/7/19 with no acute findings.    Tearfulness. Noted during previous visits related to loss of  and living at home. Is not interested in medications.     Constipation. Continue Docusate Sodium as ordered.    Electronically signed by:  MIRZA Kwan CNP

## 2019-10-30 ENCOUNTER — ASSISTED LIVING VISIT (OUTPATIENT)
Dept: GERIATRICS | Facility: CLINIC | Age: 84
End: 2019-10-30
Payer: MEDICARE

## 2019-10-30 DIAGNOSIS — M54.50 CHRONIC LOW BACK PAIN WITHOUT SCIATICA, UNSPECIFIED BACK PAIN LATERALITY: ICD-10-CM

## 2019-10-30 DIAGNOSIS — R60.0 LOCALIZED EDEMA: ICD-10-CM

## 2019-10-30 DIAGNOSIS — D64.9 ANEMIA, UNSPECIFIED TYPE: ICD-10-CM

## 2019-10-30 DIAGNOSIS — M1A.9XX0 CHRONIC GOUT WITHOUT TOPHUS, UNSPECIFIED CAUSE, UNSPECIFIED SITE: ICD-10-CM

## 2019-10-30 DIAGNOSIS — E55.9 VITAMIN D DEFICIENCY: ICD-10-CM

## 2019-10-30 DIAGNOSIS — G89.29 CHRONIC LOW BACK PAIN WITHOUT SCIATICA, UNSPECIFIED BACK PAIN LATERALITY: ICD-10-CM

## 2019-10-30 DIAGNOSIS — I10 BENIGN ESSENTIAL HYPERTENSION: ICD-10-CM

## 2019-10-30 DIAGNOSIS — I48.20 CHRONIC ATRIAL FIBRILLATION (H): ICD-10-CM

## 2019-10-30 DIAGNOSIS — I25.5 ISCHEMIC CARDIOMYOPATHY: ICD-10-CM

## 2019-10-30 DIAGNOSIS — E78.5 HYPERLIPIDEMIA, UNSPECIFIED HYPERLIPIDEMIA TYPE: ICD-10-CM

## 2019-10-30 DIAGNOSIS — M35.3 POLYMYALGIA RHEUMATICA (H): Primary | ICD-10-CM

## 2019-10-30 DIAGNOSIS — I25.10 CORONARY ARTERY DISEASE INVOLVING NATIVE HEART WITHOUT ANGINA PECTORIS, UNSPECIFIED VESSEL OR LESION TYPE: ICD-10-CM

## 2019-10-30 DIAGNOSIS — Z85.828 HISTORY OF SKIN CANCER: ICD-10-CM

## 2019-10-30 PROCEDURE — 99207 ZZC CDG-CODE CATEGORY CHANGED: CPT | Performed by: NURSE PRACTITIONER

## 2019-10-30 NOTE — LETTER
10/30/2019        RE: Gladys Garrido  [facility]  [address]  [facility] [address]        [place] [facility]  Chief Complaint   Patient presents with     Annual Comprehensive Exam Assisted Living     [place] Medical Record Number:  [id number]  Place of Service where encounter took place:  [facility] ([facility]) [[id number]]    HPI:    Gladys Garrido  is a 89 year old  (4/24/1929), who is being seen today for an annual comprehensive visit. HPI information obtained from: facility chart records, facility staff, patient report, [facility] chart review and Care Everywhere [facility] chart review.  Today's concerns are:    Coronary Artery Disease/STEMI S/P PTCA to Distal RCA/PDA/BRENDA Trifurcation/Hypertension. Denies chest pain. Upon review of blood pressures over the past month, systolic range from . Diastolic range from 53-84. Seen by Cardiology on 7/8/19. Lisinopril discontinued on 9/23/19 due to episodes of hypotension. Son, Ceferino, previously reported patient is to stay on Clopidogrel until March 2020.    Ischemic Cardiomyopathy with EF 45-50% on 10/7/19.  Denies shortness of breath. Upon review of weights, 154.2 lbs on 5/15/19 -> 147.8 lbs on 7/24/19 -> 143.8 lbs on 8/28/19 -> 130 lbs on 9/24/19 -> 142 lbs on 10/29/19. Discharge hospital weight 145 lbs on 5/5/19 and 3/26/19. Last echocardiogram on 10/7/19 stable from previous echo in April 2019.      Chronic Atrial Fibrillation on Anticoagulation. INR 2.53 on 10/29/19. Taking Warfarin 2.5 mg daily. Last INR 2.55 on 10/24/19. Heart rate 63-91 over the past month.     Chronic Bilateral Lower Extremity Edema. Does not wear compression stockings.      Polymyalgia Rheumatica. Son and patient request Sed Rate be monitored to determine Prednisone dosing, 61 on 5/30/19 and 21 on 8/16/19. Re-initiated on Prednisone 10 mg on 6/4/19. Seen by [facility] Alyssa RAMIREZ on 8/22/19 and recommended increase in Prednisone which patient  "declined at previous visit due to issues with weight gain in the past. Does not want Voltaren gel ordered from [facility] pharmacy as the price is higher than retail. Will purchase at a store if she is interested in using in the future. Has Acetaminophen available as needed.      Chronic Low Back Pain. No reports of pain today.      Gout.  Allopurinol discontinued 9/11/19 after patient and son's discussion with [facility] Pharmacist. Son requested Uric Acid be repeated. On 9/24/19, Uric Acid 7.5     Anemia. Last Hemoglobin 12.4 on 9/26/19.     Carotid Bruit. Noted during 7/8/19 Cardiology visit. Carotid ultrasound performed on 10/7/19 with no acute findings.    Tearfulness. Patient has had multiple visits where she is tearful related to being in the Assisted Living and not at her cabin. Does not like to be a \"bother\" to her children. Today, she is in good spirits, but is brief as she needs to use the restroom.     History of Skin Cancer. Mohs procedure on face 10/1/19.     Vitamin D Deficiency. Last Vitamin D level 16.5 on 9/17/19.     Constipation. No reports of issues with bowel movements.    ALLERGIES: Aspirin; Codeine; Penicillins; and Sulfa drugs  PAST MEDICAL HISTORY:  has a past medical history of A-fib (H) (11/29/2017), Blue baby, Chronic systolic CHF (congestive heart failure) (H) (04/04/2019), Concussion, Coronary artery disease involving native coronary artery of native heart without angina pectoris (04/04/2019), GERD (gastroesophageal reflux disease), History of vertebral fracture (2010), HLD (hyperlipidemia), HTN (hypertension), Lymphedema, and NSTEMI (non-ST elevated myocardial infarction) (H).  PAST SURGICAL HISTORY:  has a past surgical history that includes Arthroplasty knee; Cholecystectomy; appendectomy; Endoscopic stripping vein(s); tonsillectomy; and Insert picc line.  IMMUNIZATIONS:  Immunization History   Administered Date(s) Administered     DTaP, Unspecified 11/01/2018     Flu, Unspecified 10/20/2015     " Influenza (High Dose) 3 valent vaccine 11/11/2013, 11/11/2013, 09/26/2017, 09/26/2017, 09/04/2018, 09/04/2018     Influenza (IIV3) PF 01/11/2013     Influenza Vaccine IM > 6 months Valent IIV4 11/25/2014     Influenza Vaccine, 3 YRS +, IM (QUADRIVALENT W/PRESERVATIVES) 11/11/2013, 11/25/2014, 10/20/2015, 09/26/2017     Pneumo Conj 13-V (2010&after) 11/30/2015     Pneumococcal 23 valent 11/11/2013     Tdap (Adult) Unspecified Formulation 11/01/2018     Above immunizations pulled from [place] [employer]. [facility] and facility records also reconciled. Outstanding information sent to  to update [place] [employer].  Future immunizations are not needed at this point as all recommended immunizations are up to date.     Current Outpatient Medications   Medication Sig Dispense Refill     acetaminophen (ACETAMINOPHEN EXTRA STRENGTH) 500 MG tablet Take 2 tablets (1,000 mg) by mouth 3 times daily as needed for mild pain 28 tablet 97     clopidogrel (PLAVIX) 75 MG tablet TAKE 1 TABLET BY MOUTH ONCE DAILY 31 tablet 98     docusate sodium (COLACE) 100 MG capsule Take 1 capsule (100 mg) by mouth 2 times daily as needed       furosemide (LASIX) 20 MG tablet TAKE 1 TABLET BY MOUTH ONCE DAILY 31 tablet 98     metoprolol succinate ER (TOPROL-XL) 25 MG 24 hr tablet TAKE 1 TABLET BY MOUTH ONCE DAILY 31 tablet 98     nitroGLYcerin (NITROSTAT) 0.4 MG sublingual tablet PLACE 1 TABLET UNDER TONGUE AT ONSET OF CHEST PAIN. MAY REPEAT EVERY 5 MINUTES AS NEEDED X 3 DOSES 25 tablet 98     pravastatin (PRAVACHOL) 10 MG tablet TAKE 1 TABLET BY MOUTH ONCE DAILY 31 tablet 98     predniSONE (DELTASONE) 10 MG tablet TAKE 1 TABLET BY MOUTH ONCE DAILY 31 tablet 98     VITAMIN D3 1000 units tablet TAKE 1 TABLET BY MOUTH ONCE DAILY 31 tablet 98     warfarin (COUMADIN) 2.5 MG tablet Take by mouth daily Contact [facility] facility for dosing instructions       Case Management:  I have reviewed the Assisted Living care plan, current immunizations and preventive  care/cancer screening. .Future cancer screening is not clinically indicated secondary to age/goals of care Patient's desire to return to the community is present, but is not able due to care needs . Current Level of Care is appropriate.    Advance Directive Discussion:    I reviewed the current advanced directives as reflected in EPIC, the POLST and the facility chart, and verified the congruency of orders. I did review the advance directives with the resident.     Team Discussion:  I communicated with the appropriate disciplines involved with the Plan of Care:   Nursing    Patient's goal is pain control and comfort.  Information reviewed:  Medications, vital signs, orders, and nursing notes.    ROS:  4 point ROS including Respiratory, CV, GI and , other than that noted in the HPI,  is negative    Vitals:  BP 99/53   Pulse 91   Temp 98.2  F (36.8  C)   Resp 19   Ht 1.524 m (5')   Wt 64.4 kg (142 lb)   BMI 27.73 kg/m    Body mass index is 27.73 kg/m .  Exam:  GENERAL APPEARANCE:  Alert, in no distress  ENT:  Mouth and posterior oropharynx normal, moist mucous membranes  EYES:  EOM, conjunctivae, lids, pupils and irises normal  RESP:  respiratory effort and palpation of chest normal, lungs clear to auscultation, no respiratory distress  CV:  Palpation and auscultation of heart done , regular rate and rhythm, no rub or gallop  ABDOMEN:  normal bowel sounds, soft, nontender, no hepatosplenomegaly or other masses  M/S:   Active movement of bilateral upper and lower extremities. 1+ edema in BLE.  SKIN: Ecchymosis noted slightly below right eye. Stasis dermatitis on BLE.   NEURO:   Cranial nerves 2-12 are normal tested and grossly at patient's baseline  PSYCH:  Affect and mood WNL    Lab/Diagnostic data:   Labs done in SNF are in [facility]. Please refer to them using [employer] Everywhere.    ASSESSMENT/PLAN:  Coronary Artery Disease/STEMI S/P PTCA to Distal RCA/PDA/BRENDA Trifurcation/Hypertension. Followed by  [facility] with follow-up due around January 2020. Monitor blood pressure closely given episodes of hypotension. Lisinopril discontinued on 9/23/19 due to hypotension. Continue Clopidogrel until March 2020. Taking Metoprolol, Pravastatin and Nitroglycerin.    Ischemic Cardiomyopathy with EF 45-50% on 10/7/19. Appears euvolemic on exam. Last seen by Cardiology on 7/8/19. Last echocardiogram 10/7/19. Patient has scale in room. Baseline weight ~ low-to-mid 140s. Taking Furosemide and Metoprolol.     Chronic Atrial Fibrillation on Anticoagulation. INR therapeutic. Warfarin 5 mg on Fridays and Warfarin 2.5 mg ordered all other days. Repeat INR on 11/4/19. Heart rate WNL. Continue Metoprolol as ordered.    Polymyalgia Rheumatica. Son and patient request Sed Rate be monitored to determine Prednisone dosing, 61 on 5/30/19 and 21 on 8/16/19. Re-initiated on Prednisone 10 mg on 6/4/19. Seen by [facility] Alyssa RAMIRE[facility] on 8/22/19 and recommended increase in Prednisone which patient declines today due to issues with weight gain in the past. Does not want Voltaren gel ordered from [facility] pharmacy as the price is higher than retail. Will purchase at a store if she is interested in using in the future. Has Acetaminophen available as needed.    Bilateral Lower Extremity Edema. Chronic. Encourage elevation. Uses walker to ambulate.     Chronic Low Back Pain. No reports of pain. Acetaminophen available as needed.    Chronic Gout. Discontinued Allopurinol on 9/11/19 per [facility] pharmacist's recommendation. Uric Acid level WNL    Iron Deficiency Anemia. Iron Saturation Index 14 on 5/23/19. Patient declines Iron supplementation. Last Hemoglobin 12.4 on 9/26/19. Monitor Hemoglobin periodically to ensure stability.     History of Skin Cancer. S/P Mohs procedure on face 10/1/19.     Vitamin D Deficiency. Vitamin D level 16.5 on 9/17/19. Started on Vitamin D 1000 mg PO every day by on-call NP. Recheck Vitamin D level in 8-12 weeks to  ensure upward trend.    Carotid Bruit. Noted during 7/8/19 Cardiology visit. Carotid ultrasound performed on 10/7/19 with no acute findings.    Tearfulness. Noted during previous visits related to loss of  and living at home. Is not interested in medications.     Constipation. Continue Docusate Sodium as ordered.    Electronically signed by:  MIRZA Kwan CNP           Sincerely,        MIRZA Kwan CNP

## 2019-11-01 ENCOUNTER — TELEPHONE (OUTPATIENT)
Dept: GERIATRICS | Facility: CLINIC | Age: 84
End: 2019-11-01

## 2019-11-01 DIAGNOSIS — I10 HYPERTENSION, UNSPECIFIED TYPE: ICD-10-CM

## 2019-11-01 DIAGNOSIS — R53.81 PHYSICAL DECONDITIONING: ICD-10-CM

## 2019-11-01 DIAGNOSIS — I50.23 ACUTE ON CHRONIC SYSTOLIC HEART FAILURE (H): Primary | ICD-10-CM

## 2019-11-01 DIAGNOSIS — I25.5 ISCHEMIC CARDIOMYOPATHY: ICD-10-CM

## 2019-11-01 NOTE — TELEPHONE ENCOUNTER
Called re: med rec after ER obs stay at [facility] 10/31-11/1. She was diuresed with IV furosemide for acute on chronic CHF. She had R shoulder pain and was found to have hemarthrosis s/p arthrocentesis.     She was discharged on lisnopril 5 mg daily - this was discontinued at the [facility] on 9/23 due to hypotension. Docusate was scheduled instead of PRN. Warfarin dosing was also slightly different (5 mg MWF and 2.5 mg rest of the week).     Looks like they recommended TCU, but she can't afford it. Discharge note says home health care, but unclear if it was ordered.     BP was 90/40 in the ER. At [facility] was 88/50.    She is on monthly VS, next due on Thurs 11/7.     Plan:  -- discontinue lisinopril   -- hold metoprolol XL 25 mg through the weekend  --  to ask her if she has a dry mouth or feels thirsty with meds and if she does, they are to call RN to have furosemide held for that day  -- OK for warfarin as ordered (5 mg tonight and 2.5 mg on Sat and Sun)   -- INR on Mon 11/4 as scheduled  -- I will order [facility] home care RN, PT and OT     Lesley Contreras MD      Documentation of Face to Face and Certification for Home Health Services    I certify that patient, Gladys Garrido is under my care and that I, or a Nurse Practitioner or Physician's Assistant working with me, had a face-to-face encounter that meets the physician face-to-face encounter requirements with this patient on: 11/1/2019.    This encounter with the patient was in whole, or in part, for the following medical condition, which is the primary reason for Home Health Care: acute decompensated CHF, hypotension, R shoulder pain, physical deconditioning.    I certify that, based on my findings, the following services are medically necessary Home Health Services: Nursing, Occupational Therapy and Physical Therapy    My clinical findings support the need for the above services because: Nurse is needed: vital signs and volume status, med education, pain  assessment; Occupational Therapy Services are needed to assess and treat cognitive ability and address ADL safety due to impairment in strength, balance, endurance, ADLs. and Physical Therapy Services are needed to assess and treat the following functional impairments: strength, balance, endurance,.    Further, I certify that my clinical findings support that this patient is homebound (i.e. absences from home require considerable and taxing effort and are for medical reasons or [facility] or infrequently or of short duration when for other reasons) because: unable to leave home without assistance    Based on the above findings, I certify that this patient is confined to the home and needs intermittent skilled nursing care, physical therapy and/or speech therapy.  The patient is under my care, and I have initiated the establishment of the plan of care.  This patient will be followed by a physician who will periodically review the plan of care.    Physician/Provider to provide follow up care: Bridgett Delarosa certified Physician at time of discharge: Lesley Contreras MD  Electronically signed by: Lesley Contreras MD    Please be aware that coverage of these services is subject to the terms and limitations of your health insurance plan.  Call member services at your health plan with any benefit or coverage questions.

## 2019-11-03 ENCOUNTER — CARE COORDINATION (OUTPATIENT)
Dept: GERIATRICS | Facility: CLINIC | Age: 84
End: 2019-11-03

## 2019-11-04 ENCOUNTER — TELEPHONE (OUTPATIENT)
Dept: GERIATRICS | Facility: CLINIC | Age: 84
End: 2019-11-04

## 2019-11-04 ENCOUNTER — RECORDS - HEALTHEAST (OUTPATIENT)
Dept: LAB | Facility: CLINIC | Age: 84
End: 2019-11-04

## 2019-11-04 LAB — INR PPP: 1.93 (ref 0.9–1.1)

## 2019-11-04 NOTE — TELEPHONE ENCOUNTER
ON-CALL [facility] CROSS-COVERAGE NOTE:    Ordered warfarin 5 mg for tonight for INR of 1.93. Asked RN to repeat INR in am.    ERIC Pichardo MD

## 2019-11-12 PROBLEM — E87.70 VOLUME OVERLOAD: Status: ACTIVE | Noted: 2019-10-31

## 2019-11-13 ENCOUNTER — ASSISTED LIVING VISIT (OUTPATIENT)
Dept: GERIATRICS | Facility: CLINIC | Age: 84
End: 2019-11-13
Payer: MEDICARE

## 2019-11-13 VITALS
SYSTOLIC BLOOD PRESSURE: 120 MMHG | DIASTOLIC BLOOD PRESSURE: 64 MMHG | BODY MASS INDEX: 27.89 KG/M2 | WEIGHT: 142.8 LBS | RESPIRATION RATE: 18 BRPM | TEMPERATURE: 97.5 F | HEART RATE: 72 BPM

## 2019-11-13 DIAGNOSIS — G89.29 CHRONIC RIGHT SHOULDER PAIN: ICD-10-CM

## 2019-11-13 DIAGNOSIS — I48.20 CHRONIC ATRIAL FIBRILLATION (H): Primary | ICD-10-CM

## 2019-11-13 DIAGNOSIS — F32.A DEPRESSION, UNSPECIFIED DEPRESSION TYPE: ICD-10-CM

## 2019-11-13 DIAGNOSIS — I25.5 ISCHEMIC CARDIOMYOPATHY: ICD-10-CM

## 2019-11-13 DIAGNOSIS — M25.511 CHRONIC RIGHT SHOULDER PAIN: ICD-10-CM

## 2019-11-13 NOTE — LETTER
11/13/2019        RE: Gladys Garrido  [facility]  [address]  [facility] [address]        [place] [facility]  [place] Medical Record Number:  [id number]  Place of Service where encounter took place:  [facility] ([facility] [[id number]]  Chief Complaint   Patient presents with     RECHECK     HPI:    Gladys Garrido  is a 89 year old (4/24/1929), who is being seen today for an episodic care visit.  HPI information obtained from: facility chart records, facility staff, patient report and [facility] chart review. Today's concern is:    Chronic Atrial Fibrillation on Anticoagulation. INR 2.3 on 11/8/19. Ordered Warfarin 5 mg PO on M, F and Warfarin 2.5 mg PO on all other days. Repeat INR on 11/15/19. Previous INR 1.93 on 11/4/19 with Warfarin 5 mg PO on 11/4/19 and 2.5 mg PO all other days.    Right Shoulder Pain with History of Polymyalgia Rheumatica. On 10/31/19, went to [facility] ED for right shoulder pain. Right shoulder x-ray negative for acute findings. Right shoulder aspirated and conclusion to be inflammatory. No anaerobic growth in 5 days. Following overnight stay in hospital for fluid overload, communicated with son and increased Prednisone to 20 mg PO every day x 1 week. Received report from home care nurse right shoulder pain had not improved with the increased Prednisone. Also noted shoulder pain the left. Today, patient states she's not sure if her shoulder felt any better with the increased Prednisone dose. Has been taking Tylenol for neck pain. Wishes someone would make the decision for her whether or not to increase the Prednisone dose for a longer period of time. Refusing to pay for Voltaren gel which son requested be initiated per [facility] Ortho PA-C recommendation.     Ischemic Cardiomyopathy with EF 45-50% on 10/7/19. Patient notes weights on yellow calendar which she takes daily. Talks about her weight going up due to the Prednisone. Denies  "shortness of breath. Upon review of patient's calendar, weight 137.8 lbs on 11/5/19 -> 142.8 lbs on 11/12/19. Hospitalized 10/31/19 through 11/1/19 for volume overload as evidenced by chest x-ray which revealed \"Perihilar peribronchial cuffing could represent mild pulmonary edema from congestive heart failure\". . Received IV Furosemide for a total of 100 mg.     Hypertension. Hypotensive upon return from ED on 11/1/19. Metoprolol ordered to be held by on-call MD. On 11/12/19, blood pressure 120/64 and heart rate 72.    Depression. Per [facility] RN, requested home therapy stop as having people come work her and then leave made her depressed. Today, patient is tearful. States she doesn't understand why everyone keeps trying to find things wrong with her body. Has never had so many issues with her body as she has now. Feels people are too young to understand. Thinks the machine for her INR is better than lab draws that cause so much bruising. Doesn't like being told she is bruising so much because she's older.     Past Medical and Surgical History reviewed in Epic today.    MEDICATIONS:  Current Outpatient Medications   Medication Sig Dispense Refill     acetaminophen (ACETAMINOPHEN EXTRA STRENGTH) 500 MG tablet Take 2 tablets (1,000 mg) by mouth 3 times daily as needed for mild pain 28 tablet 97     clopidogrel (PLAVIX) 75 MG tablet TAKE 1 TABLET BY MOUTH ONCE DAILY 31 tablet 98     docusate sodium (COLACE) 100 MG capsule Take 1 capsule (100 mg) by mouth 2 times daily as needed       furosemide (LASIX) 20 MG tablet TAKE 1 TABLET BY MOUTH ONCE DAILY 31 tablet 98     metoprolol succinate ER (TOPROL-XL) 25 MG 24 hr tablet TAKE 1 TABLET BY MOUTH ONCE DAILY 31 tablet 98     nitroGLYcerin (NITROSTAT) 0.4 MG sublingual tablet PLACE 1 TABLET UNDER TONGUE AT ONSET OF CHEST PAIN. MAY REPEAT EVERY 5 MINUTES AS NEEDED X 3 DOSES 25 tablet 98     pravastatin (PRAVACHOL) 10 MG tablet TAKE 1 TABLET BY MOUTH ONCE " DAILY 31 tablet 98     predniSONE (DELTASONE) 10 MG tablet TAKE 1 TABLET BY MOUTH ONCE DAILY 31 tablet 98     VITAMIN D3 1000 units tablet TAKE 1 TABLET BY MOUTH ONCE DAILY 31 tablet 98     warfarin (COUMADIN) 2.5 MG tablet Take by mouth daily Contact [facility] facility for dosing instructions       REVIEW OF SYSTEMS:  4 point ROS including Respiratory, CV, GI and , other than that noted in the HPI,  is negative    Objective:  /64   Pulse 72   Temp 97.5  F (36.4  C)   Resp 18   Wt 64.8 kg (142 lb 12.8 oz)   BMI 27.89 kg/m     Exam:  GENERAL APPEARANCE:  in no distress, anxious  ENT:  Mouth and posterior oropharynx normal, moist mucous membranes  EYES:  EOM, conjunctivae, lids, pupils and irises normal  RESP:  no respiratory distress, Refuses physical examination of heart, lungs   NEURO:   Cranial nerves 2-12 are normal tested and grossly at patient's baseline  PSYCH:  sad    Labs:   Labs done in SNF are in [facility]. Please refer to them using [employer] Everywhere.    ASSESSMENT/PLAN:  Chronic Atrial Fibrillation on Anticoagulation. INR therapeutic. Continue Warfarin 5 mg on M, F and 2.5 mg all other days. Repeat INR on 11/15/19 to ensure stability.     Right Shoulder Pain with History of Polymyalgia Rheumatica. Seen in ED on 10/31/19 with right shoulder pain. Right shoulder x-ray with no acute findings. Synovial fluid positive for inflammation. Increased Prednisone to 20 mg PO every day x 1 week on 11/4/19. Also ordered Voltaren gel per communication with son. Patient does not want to pay out of pocket for gel. Is also not sure if increased dose of Prednisone helped her pain. Requested [facility] Ortho PA-C contact her which he did last week. Patient request Ortho PA-C not update son. Will resume Prednisone 10 mg PO every day for PMR as patient is uncertain if increased dose helped and does not like side effects of steroid. Has Acetaminophen available for pain.     Ischemic Cardiomyopathy with EF 45-50% on  10/7/19. Due to weight increase of 5 lbs in 1 week, recommended increase in Furosemide x 3 days to 40 mg. Patient refused physical examination so uncertain of lung sounds. Followed by [facility] RN.     Hypertension. Metoprolol on hold since hospital return 11/1/19. Ordered blood pressure and heart rate daily x 5 days to evaluate need for medication.     Depression. Has been tearful nearly every visit for the past 6 months. Patient declines Physical and Occupational Therapy as it makes her too depressed. Has declined antidepressant medication. Have communicated patient's mood to both son and daughter which daughter reports is her baseline particularly with medical professionals. Does not follow with mental health as this is not a service provided at the facility. Continue to monitor.     Electronically signed by:  MIRZA Kwan CNP             Sincerely,        MIRZA Kwan CNP     numerical 0-10

## 2019-11-25 ENCOUNTER — MEDICAL CORRESPONDENCE (OUTPATIENT)
Dept: HEALTH INFORMATION MANAGEMENT | Facility: CLINIC | Age: 84
End: 2019-11-25

## 2019-12-02 LAB
ANION GAP SERPL CALCULATED.3IONS-SCNC: 10 MMOL/L (ref 3–14)
BUN SERPL-MCNC: 27 MG/DL (ref 7–30)
CALCIUM SERPL-MCNC: 9.1 MG/DL (ref 8.5–10.1)
CHLORIDE SERPL-SCNC: 105 MMOL/L (ref 94–109)
CO2 SERPL-SCNC: 23 MMOL/L (ref 20–32)
CREAT SERPL-MCNC: 0.69 MG/DL (ref 0.52–1.04)
GFR SERPL CREATININE-BSD FRML MDRD: 76 ML/MIN/{1.73_M2}
GLUCOSE SERPL-MCNC: 129 MG/DL (ref 70–99)
POTASSIUM SERPL-SCNC: 3.2 MMOL/L (ref 3.4–5.3)
SODIUM SERPL-SCNC: 138 MMOL/L (ref 133–144)

## 2019-12-02 PROCEDURE — 82306 VITAMIN D 25 HYDROXY: CPT | Performed by: INTERNAL MEDICINE

## 2019-12-02 PROCEDURE — 80048 BASIC METABOLIC PNL TOTAL CA: CPT | Performed by: INTERNAL MEDICINE

## 2019-12-03 LAB — DEPRECATED CALCIDIOL+CALCIFEROL SERPL-MC: 18 UG/L (ref 20–75)

## 2019-12-06 ENCOUNTER — TELEPHONE (OUTPATIENT)
Dept: GERIATRICS | Facility: CLINIC | Age: 84
End: 2019-12-06

## 2019-12-07 NOTE — TELEPHONE ENCOUNTER
"On 12/2/19, patient noted to have a cough with rales in the RLL by [facility] RN. Weight 141 lbs. Ordered chest x-ray which revealed \"Enlarged heart and diffuse interstitial changes which warrant follow-up. This may be related to congestive heart failure, hydration or pneumonia\". Increased Furosemide to 20 mg PO BID x 3 days.    On 12/5/19, noted by [facility] staff to be better, but still requiring assistance with transfers. Lung sounds clear.    On 12/6/19, seen by different [facility] Nurse. VS , BP 98/60 SpO2 93%. Weight 138 lbs. Requiring more assistance with transfers. Stated she was weak. Contacted daughter, Rosalina, and discussed plan of care including further work-up in ED versus treating chest x-ray as pneumonia with antibiotic. Daughter inquired about hospice. Requests to treat with antibiotic for possible pneumonia and re-evaluate plan of care next week after talking to patient.     Spoke to son who was in agreement with plan. If patient does not improve, questioned further cardiovascular work-up. Is interested in learning more about [facility].     Orders:  Levaquin 500 mg PO every day x 5 days  [facility] referral  Decrease Warfarin to 1 mg on 12/6/19 and 12/8/19 and Warfarin 2 mg on 12/7/19 given antibiotic use. Recheck INR on 12/9/19. Last INR 2.9 on 12/2/19 while on Warfarin 2.5 mg PO daily.    "

## 2019-12-12 ENCOUNTER — ALLIED HEALTH/NURSE VISIT (OUTPATIENT)
Dept: FAMILY MEDICINE | Facility: CLINIC | Age: 84
End: 2019-12-12
Payer: MEDICARE

## 2019-12-12 DIAGNOSIS — Z23 ENCOUNTER FOR IMMUNIZATION: Primary | ICD-10-CM

## 2019-12-12 PROCEDURE — 99207 ZZC NO BILLABLE SERVICE THIS VISIT: CPT

## 2019-12-13 NOTE — PROGRESS NOTES
Influenza immunization not given as patient is in hospital at this time. Kasandra Jaquez RN December 13, 2019 4:36 PM

## 2020-02-28 ENCOUNTER — RECORDS - HEALTHEAST (OUTPATIENT)
Dept: LAB | Facility: CLINIC | Age: 85
End: 2020-02-28

## 2020-02-28 LAB — INR PPP: 2.24 (ref 0.9–1.1)

## 2020-03-02 ENCOUNTER — RECORDS - HEALTHEAST (OUTPATIENT)
Dept: LAB | Facility: CLINIC | Age: 85
End: 2020-03-02

## 2020-03-02 LAB — INR PPP: 2.7 (ref 0.9–1.1)

## 2020-03-06 ENCOUNTER — RECORDS - HEALTHEAST (OUTPATIENT)
Dept: LAB | Facility: CLINIC | Age: 85
End: 2020-03-06

## 2020-03-09 LAB — INR PPP: 2.56 (ref 0.9–1.1)

## 2020-03-24 ENCOUNTER — RECORDS - HEALTHEAST (OUTPATIENT)
Dept: LAB | Facility: CLINIC | Age: 85
End: 2020-03-24

## 2020-03-24 LAB — INR PPP: 2.14 (ref 0.9–1.1)

## 2020-03-30 ENCOUNTER — RECORDS - HEALTHEAST (OUTPATIENT)
Dept: LAB | Facility: CLINIC | Age: 85
End: 2020-03-30

## 2020-03-30 LAB — INR PPP: 2.16 (ref 0.9–1.1)

## 2020-04-09 ENCOUNTER — RECORDS - HEALTHEAST (OUTPATIENT)
Dept: LAB | Facility: CLINIC | Age: 85
End: 2020-04-09

## 2020-04-09 LAB
ANION GAP SERPL CALCULATED.3IONS-SCNC: 12 MMOL/L (ref 5–18)
BASOPHILS # BLD AUTO: 0 THOU/UL (ref 0–0.2)
BASOPHILS NFR BLD AUTO: 0 % (ref 0–2)
BNP SERPL-MCNC: 1485 PG/ML (ref 0–167)
BUN SERPL-MCNC: 20 MG/DL (ref 8–28)
CALCIUM SERPL-MCNC: 9.5 MG/DL (ref 8.5–10.5)
CHLORIDE BLD-SCNC: 105 MMOL/L (ref 98–107)
CO2 SERPL-SCNC: 26 MMOL/L (ref 22–31)
CREAT SERPL-MCNC: 0.84 MG/DL (ref 0.6–1.1)
EOSINOPHIL # BLD AUTO: 0.2 THOU/UL (ref 0–0.4)
EOSINOPHIL NFR BLD AUTO: 4 % (ref 0–6)
ERYTHROCYTE [DISTWIDTH] IN BLOOD BY AUTOMATED COUNT: 15.3 % (ref 11–14.5)
GFR SERPL CREATININE-BSD FRML MDRD: >60 ML/MIN/1.73M2
GLUCOSE BLD-MCNC: 154 MG/DL (ref 70–125)
HCT VFR BLD AUTO: 36.7 % (ref 35–47)
HGB BLD-MCNC: 11.5 G/DL (ref 12–16)
LYMPHOCYTES # BLD AUTO: 0.9 THOU/UL (ref 0.8–4.4)
LYMPHOCYTES NFR BLD AUTO: 16 % (ref 20–40)
MCH RBC QN AUTO: 29.8 PG (ref 27–34)
MCHC RBC AUTO-ENTMCNC: 31.3 G/DL (ref 32–36)
MCV RBC AUTO: 95 FL (ref 80–100)
MONOCYTES # BLD AUTO: 0.4 THOU/UL (ref 0–0.9)
MONOCYTES NFR BLD AUTO: 8 % (ref 2–10)
NEUTROPHILS # BLD AUTO: 4 THOU/UL (ref 2–7.7)
NEUTROPHILS NFR BLD AUTO: 71 % (ref 50–70)
PLATELET # BLD AUTO: 216 THOU/UL (ref 140–440)
PMV BLD AUTO: 10.7 FL (ref 8.5–12.5)
POTASSIUM BLD-SCNC: 2.8 MMOL/L (ref 3.5–5)
RBC # BLD AUTO: 3.86 MILL/UL (ref 3.8–5.4)
SODIUM SERPL-SCNC: 143 MMOL/L (ref 136–145)
WBC: 5.6 THOU/UL (ref 4–11)

## 2020-04-13 ENCOUNTER — RECORDS - HEALTHEAST (OUTPATIENT)
Dept: LAB | Facility: CLINIC | Age: 85
End: 2020-04-13

## 2020-04-13 LAB
ANION GAP SERPL CALCULATED.3IONS-SCNC: 10 MMOL/L (ref 5–18)
BUN SERPL-MCNC: 23 MG/DL (ref 8–28)
CALCIUM SERPL-MCNC: 9.8 MG/DL (ref 8.5–10.5)
CHLORIDE BLD-SCNC: 103 MMOL/L (ref 98–107)
CO2 SERPL-SCNC: 29 MMOL/L (ref 22–31)
CREAT SERPL-MCNC: 1.02 MG/DL (ref 0.6–1.1)
GFR SERPL CREATININE-BSD FRML MDRD: 51 ML/MIN/1.73M2
GLUCOSE BLD-MCNC: 153 MG/DL (ref 70–125)
POTASSIUM BLD-SCNC: 4.3 MMOL/L (ref 3.5–5)
SODIUM SERPL-SCNC: 142 MMOL/L (ref 136–145)

## 2020-04-30 ENCOUNTER — RECORDS - HEALTHEAST (OUTPATIENT)
Dept: LAB | Facility: CLINIC | Age: 85
End: 2020-04-30

## 2020-04-30 LAB — INR PPP: 1.76 (ref 0.9–1.1)

## 2020-05-14 ENCOUNTER — RECORDS - HEALTHEAST (OUTPATIENT)
Dept: LAB | Facility: CLINIC | Age: 85
End: 2020-05-14

## 2020-05-14 LAB — INR PPP: 2.06 (ref 0.9–1.1)

## 2020-06-15 ENCOUNTER — RECORDS - HEALTHEAST (OUTPATIENT)
Dept: LAB | Facility: CLINIC | Age: 85
End: 2020-06-15

## 2020-06-15 LAB — INR PPP: 2.9 (ref 0.9–1.1)

## 2020-07-02 ENCOUNTER — RECORDS - HEALTHEAST (OUTPATIENT)
Dept: LAB | Facility: CLINIC | Age: 85
End: 2020-07-02

## 2020-07-02 LAB — INR PPP: 2.56 (ref 0.9–1.1)

## 2020-08-01 ENCOUNTER — RECORDS - HEALTHEAST (OUTPATIENT)
Dept: LAB | Facility: CLINIC | Age: 85
End: 2020-08-01

## 2020-08-03 LAB — INR PPP: 2 (ref 0.9–1.1)

## 2020-08-20 ENCOUNTER — RECORDS - HEALTHEAST (OUTPATIENT)
Dept: LAB | Facility: CLINIC | Age: 85
End: 2020-08-20

## 2020-08-24 LAB — INR PPP: 1.44 (ref 0.9–1.1)

## 2020-09-03 ENCOUNTER — RECORDS - HEALTHEAST (OUTPATIENT)
Dept: LAB | Facility: CLINIC | Age: 85
End: 2020-09-03

## 2020-09-04 LAB — INR PPP: 1.53 (ref 0.9–1.1)

## 2020-09-16 ENCOUNTER — RECORDS - HEALTHEAST (OUTPATIENT)
Dept: LAB | Facility: CLINIC | Age: 85
End: 2020-09-16

## 2020-09-17 LAB — INR PPP: 1.47 (ref 0.9–1.1)

## 2020-09-23 ENCOUNTER — RECORDS - HEALTHEAST (OUTPATIENT)
Dept: LAB | Facility: CLINIC | Age: 85
End: 2020-09-23

## 2020-09-24 LAB — INR PPP: 1.68 (ref 0.9–1.1)

## 2020-09-30 ENCOUNTER — RECORDS - HEALTHEAST (OUTPATIENT)
Dept: LAB | Facility: CLINIC | Age: 85
End: 2020-09-30

## 2020-10-01 LAB — INR PPP: 1.63 (ref 0.9–1.1)

## 2020-10-07 ENCOUNTER — RECORDS - HEALTHEAST (OUTPATIENT)
Dept: LAB | Facility: CLINIC | Age: 85
End: 2020-10-07

## 2020-10-08 LAB — INR PPP: 2.05 (ref 0.9–1.1)

## 2020-10-21 ENCOUNTER — RECORDS - HEALTHEAST (OUTPATIENT)
Dept: LAB | Facility: CLINIC | Age: 85
End: 2020-10-21

## 2020-10-22 LAB — INR PPP: 2.15 (ref 0.9–1.1)

## 2020-11-18 ENCOUNTER — RECORDS - HEALTHEAST (OUTPATIENT)
Dept: LAB | Facility: CLINIC | Age: 85
End: 2020-11-18

## 2020-11-19 LAB — INR PPP: 2.28 (ref 0.9–1.1)

## 2020-12-16 ENCOUNTER — RECORDS - HEALTHEAST (OUTPATIENT)
Dept: LAB | Facility: CLINIC | Age: 85
End: 2020-12-16

## 2020-12-17 LAB — INR PPP: 2.42 (ref 0.9–1.1)

## 2021-01-01 ENCOUNTER — RECORDS - HEALTHEAST (OUTPATIENT)
Dept: LAB | Facility: CLINIC | Age: 86
End: 2021-01-01

## 2021-01-01 ENCOUNTER — LAB REQUISITION (OUTPATIENT)
Dept: LAB | Facility: CLINIC | Age: 86
End: 2021-01-01
Payer: MEDICARE

## 2021-01-01 DIAGNOSIS — I50.40 UNSPECIFIED COMBINED SYSTOLIC (CONGESTIVE) AND DIASTOLIC (CONGESTIVE) HEART FAILURE (H): ICD-10-CM

## 2021-01-01 DIAGNOSIS — I48.91 UNSPECIFIED ATRIAL FIBRILLATION (H): ICD-10-CM

## 2021-01-01 DIAGNOSIS — R41.82 ALTERED MENTAL STATUS, UNSPECIFIED: ICD-10-CM

## 2021-01-01 DIAGNOSIS — N18.31 CHRONIC KIDNEY DISEASE, STAGE 3A (H): ICD-10-CM

## 2021-01-01 DIAGNOSIS — E87.6 HYPOKALEMIA: ICD-10-CM

## 2021-01-01 LAB
ALBUMIN SERPL-MCNC: 2.8 G/DL (ref 3.5–5)
ALBUMIN SERPL-MCNC: 2.8 G/DL (ref 3.5–5)
ALBUMIN UR-MCNC: NEGATIVE MG/DL
ALP SERPL-CCNC: 345 U/L (ref 45–120)
ALP SERPL-CCNC: 346 U/L (ref 45–120)
ALT SERPL W P-5'-P-CCNC: 15 U/L (ref 0–45)
ALT SERPL W P-5'-P-CCNC: 16 U/L (ref 0–45)
ANION GAP SERPL CALCULATED.3IONS-SCNC: 10 MMOL/L (ref 5–18)
ANION GAP SERPL CALCULATED.3IONS-SCNC: 11 MMOL/L (ref 5–18)
APPEARANCE UR: CLEAR
AST SERPL W P-5'-P-CCNC: 26 U/L (ref 0–40)
AST SERPL W P-5'-P-CCNC: 29 U/L (ref 0–40)
BACTERIA UR CULT: NO GROWTH
BASOPHILS # BLD AUTO: 0 10E3/UL (ref 0–0.2)
BASOPHILS # BLD AUTO: 0 THOU/UL (ref 0–0.2)
BASOPHILS NFR BLD AUTO: 0 %
BASOPHILS NFR BLD AUTO: 0 % (ref 0–2)
BILIRUB SERPL-MCNC: 1.1 MG/DL (ref 0–1)
BILIRUB SERPL-MCNC: 1.5 MG/DL (ref 0–1)
BILIRUB UR QL STRIP: NEGATIVE
BUN SERPL-MCNC: 23 MG/DL (ref 8–28)
BUN SERPL-MCNC: 25 MG/DL (ref 8–28)
CALCIUM SERPL-MCNC: 9.3 MG/DL (ref 8.5–10.5)
CALCIUM SERPL-MCNC: 9.7 MG/DL (ref 8.5–10.5)
CHLORIDE BLD-SCNC: 101 MMOL/L (ref 98–107)
CHLORIDE BLD-SCNC: 102 MMOL/L (ref 98–107)
CO2 SERPL-SCNC: 29 MMOL/L (ref 22–31)
CO2 SERPL-SCNC: 30 MMOL/L (ref 22–31)
COLOR UR AUTO: YELLOW
CREAT SERPL-MCNC: 0.77 MG/DL (ref 0.6–1.1)
CREAT SERPL-MCNC: 0.8 MG/DL (ref 0.6–1.1)
EOSINOPHIL # BLD AUTO: 0.1 10E3/UL (ref 0–0.7)
EOSINOPHIL # BLD AUTO: 0.3 THOU/UL (ref 0–0.4)
EOSINOPHIL NFR BLD AUTO: 2 %
EOSINOPHIL NFR BLD AUTO: 5 % (ref 0–6)
ERYTHROCYTE [DISTWIDTH] IN BLOOD BY AUTOMATED COUNT: 14.2 % (ref 11–14.5)
ERYTHROCYTE [DISTWIDTH] IN BLOOD BY AUTOMATED COUNT: 16 % (ref 10–15)
GFR SERPL CREATININE-BSD FRML MDRD: 67 ML/MIN/1.73M2
GFR SERPL CREATININE-BSD FRML MDRD: >60 ML/MIN/1.73M2
GLUCOSE BLD-MCNC: 129 MG/DL (ref 70–125)
GLUCOSE BLD-MCNC: 85 MG/DL (ref 70–125)
GLUCOSE UR STRIP-MCNC: NEGATIVE MG/DL
HCT VFR BLD AUTO: 33.5 % (ref 35–47)
HCT VFR BLD AUTO: 37.3 % (ref 35–47)
HGB BLD-MCNC: 10.4 G/DL (ref 11.7–15.7)
HGB BLD-MCNC: 11.3 G/DL (ref 12–16)
HGB UR QL STRIP: NEGATIVE
IMM GRANULOCYTES # BLD: 0 10E3/UL
IMM GRANULOCYTES # BLD: 0 THOU/UL
IMM GRANULOCYTES NFR BLD: 0 %
IMM GRANULOCYTES NFR BLD: 0 %
INR PPP: 1.83 (ref 0.85–1.15)
INR PPP: 1.84 (ref 0.85–1.15)
INR PPP: 2 (ref 0.9–1.1)
INR PPP: 2.09 (ref 0.9–1.1)
INR PPP: 2.13 (ref 0.9–1.1)
INR PPP: 2.17 (ref 0.9–1.1)
INR PPP: 2.3 (ref 0.85–1.15)
INR PPP: 2.5 (ref 0.85–1.15)
INR PPP: 2.56 (ref 0.85–1.15)
INR PPP: 2.7 (ref 0.85–1.15)
INR PPP: 2.96 (ref 0.85–1.15)
INR PPP: 3.01 (ref 0.9–1.1)
INR PPP: 3.17 (ref 0.9–1.1)
INR PPP: 3.35 (ref 0.85–1.15)
INR PPP: 4.12 (ref 0.9–1.1)
KETONES UR STRIP-MCNC: NEGATIVE MG/DL
LEUKOCYTE ESTERASE UR QL STRIP: NEGATIVE
LYMPHOCYTES # BLD AUTO: 1.4 THOU/UL (ref 0.8–4.4)
LYMPHOCYTES # BLD AUTO: 1.5 10E3/UL (ref 0.8–5.3)
LYMPHOCYTES NFR BLD AUTO: 23 % (ref 20–40)
LYMPHOCYTES NFR BLD AUTO: 33 %
MCH RBC QN AUTO: 28.6 PG (ref 26.5–33)
MCH RBC QN AUTO: 28.8 PG (ref 27–34)
MCHC RBC AUTO-ENTMCNC: 30.3 G/DL (ref 32–36)
MCHC RBC AUTO-ENTMCNC: 31 G/DL (ref 31.5–36.5)
MCV RBC AUTO: 92 FL (ref 78–100)
MCV RBC AUTO: 95 FL (ref 80–100)
MONOCYTES # BLD AUTO: 0.4 10E3/UL (ref 0–1.3)
MONOCYTES # BLD AUTO: 0.5 THOU/UL (ref 0–0.9)
MONOCYTES NFR BLD AUTO: 8 %
MONOCYTES NFR BLD AUTO: 9 % (ref 2–10)
NEUTROPHILS # BLD AUTO: 2.5 10E3/UL (ref 1.6–8.3)
NEUTROPHILS # BLD AUTO: 3.9 THOU/UL (ref 2–7.7)
NEUTROPHILS NFR BLD AUTO: 57 %
NEUTROPHILS NFR BLD AUTO: 64 % (ref 50–70)
NITRATE UR QL: NEGATIVE
NRBC # BLD AUTO: 0 10E3/UL
NRBC BLD AUTO-RTO: 0 /100
PH UR STRIP: 5 [PH] (ref 5–7)
PLATELET # BLD AUTO: 266 10E3/UL (ref 150–450)
PLATELET # BLD AUTO: 303 THOU/UL (ref 140–440)
PMV BLD AUTO: 10.3 FL (ref 8.5–12.5)
POTASSIUM BLD-SCNC: 3.9 MMOL/L (ref 3.5–5)
POTASSIUM BLD-SCNC: 4 MMOL/L (ref 3.5–5)
PROT SERPL-MCNC: 6.5 G/DL (ref 6–8)
PROT SERPL-MCNC: 7.1 G/DL (ref 6–8)
RBC # BLD AUTO: 3.64 10E6/UL (ref 3.8–5.2)
RBC # BLD AUTO: 3.93 MILL/UL (ref 3.8–5.4)
SODIUM SERPL-SCNC: 141 MMOL/L (ref 136–145)
SODIUM SERPL-SCNC: 142 MMOL/L (ref 136–145)
SP GR UR STRIP: 1.01 (ref 1–1.03)
UROBILINOGEN UR STRIP-MCNC: 2 MG/DL
WBC # BLD AUTO: 4.5 10E3/UL (ref 4–11)
WBC: 6.2 THOU/UL (ref 4–11)

## 2021-01-01 PROCEDURE — 85610 PROTHROMBIN TIME: CPT | Mod: ORL | Performed by: PHYSICIAN ASSISTANT

## 2021-01-01 PROCEDURE — 36415 COLL VENOUS BLD VENIPUNCTURE: CPT | Mod: ORL | Performed by: PHYSICIAN ASSISTANT

## 2021-01-01 PROCEDURE — P9604 ONE-WAY ALLOW PRORATED TRIP: HCPCS | Mod: ORL | Performed by: PHYSICIAN ASSISTANT

## 2021-01-01 PROCEDURE — P9603 ONE-WAY ALLOW PRORATED MILES: HCPCS | Mod: ORL | Performed by: PHYSICIAN ASSISTANT

## 2021-01-01 PROCEDURE — 85025 COMPLETE CBC W/AUTO DIFF WBC: CPT | Mod: ORL | Performed by: PHYSICIAN ASSISTANT

## 2021-01-01 PROCEDURE — 87086 URINE CULTURE/COLONY COUNT: CPT | Mod: ORL | Performed by: PHYSICIAN ASSISTANT

## 2021-01-01 PROCEDURE — 81003 URINALYSIS AUTO W/O SCOPE: CPT | Mod: ORL | Performed by: PHYSICIAN ASSISTANT

## 2021-01-01 PROCEDURE — 80053 COMPREHEN METABOLIC PANEL: CPT | Mod: ORL | Performed by: PHYSICIAN ASSISTANT

## 2021-01-01 PROCEDURE — 85610 PROTHROMBIN TIME: CPT | Mod: ORL | Performed by: NURSE PRACTITIONER

## 2021-01-01 PROCEDURE — 85610 PROTHROMBIN TIME: CPT | Mod: ORL

## 2021-01-01 PROCEDURE — P9603 ONE-WAY ALLOW PRORATED MILES: HCPCS | Mod: ORL | Performed by: NURSE PRACTITIONER

## 2021-01-01 PROCEDURE — P9603 ONE-WAY ALLOW PRORATED MILES: HCPCS | Mod: ORL

## 2021-01-01 PROCEDURE — 36415 COLL VENOUS BLD VENIPUNCTURE: CPT | Mod: ORL

## 2021-01-01 PROCEDURE — P9604 ONE-WAY ALLOW PRORATED TRIP: HCPCS | Mod: ORL

## 2021-01-01 PROCEDURE — 36415 COLL VENOUS BLD VENIPUNCTURE: CPT | Mod: ORL | Performed by: NURSE PRACTITIONER

## 2021-01-11 ENCOUNTER — RECORDS - HEALTHEAST (OUTPATIENT)
Dept: LAB | Facility: CLINIC | Age: 86
End: 2021-01-11

## 2021-01-14 LAB — INR PPP: 2.54 (ref 0.9–1.1)

## 2021-02-10 ENCOUNTER — RECORDS - HEALTHEAST (OUTPATIENT)
Dept: LAB | Facility: CLINIC | Age: 86
End: 2021-02-10

## 2021-02-12 LAB — INR PPP: 3.68 (ref 0.9–1.1)

## 2021-02-18 ENCOUNTER — RECORDS - HEALTHEAST (OUTPATIENT)
Dept: LAB | Facility: CLINIC | Age: 86
End: 2021-02-18

## 2021-02-19 LAB — INR PPP: 3.42 (ref 0.9–1.1)

## 2021-03-02 ENCOUNTER — RECORDS - HEALTHEAST (OUTPATIENT)
Dept: LAB | Facility: CLINIC | Age: 86
End: 2021-03-02

## 2021-03-04 LAB — INR PPP: 2.4 (ref 0.9–1.1)

## 2021-03-15 ENCOUNTER — RECORDS - HEALTHEAST (OUTPATIENT)
Dept: LAB | Facility: CLINIC | Age: 86
End: 2021-03-15

## 2021-03-18 ENCOUNTER — RECORDS - HEALTHEAST (OUTPATIENT)
Dept: LAB | Facility: CLINIC | Age: 86
End: 2021-03-18

## 2021-03-18 LAB — INR PPP: 2.24 (ref 0.9–1.1)

## 2021-03-19 LAB — INR PPP: 2.2 (ref 0.9–1.1)

## 2021-04-16 ENCOUNTER — RECORDS - HEALTHEAST (OUTPATIENT)
Dept: LAB | Facility: CLINIC | Age: 86
End: 2021-04-16

## 2021-04-19 LAB — INR PPP: 3.64 (ref 0.9–1.1)

## 2022-01-01 ENCOUNTER — LAB REQUISITION (OUTPATIENT)
Dept: LAB | Facility: CLINIC | Age: 87
End: 2022-01-01
Payer: MEDICARE

## 2022-01-01 DIAGNOSIS — I48.91 UNSPECIFIED ATRIAL FIBRILLATION (H): ICD-10-CM

## 2022-01-01 DIAGNOSIS — I50.40 UNSPECIFIED COMBINED SYSTOLIC (CONGESTIVE) AND DIASTOLIC (CONGESTIVE) HEART FAILURE (H): ICD-10-CM

## 2022-01-01 DIAGNOSIS — R41.82 ALTERED MENTAL STATUS, UNSPECIFIED: ICD-10-CM

## 2022-01-01 LAB
ALBUMIN SERPL-MCNC: 3.1 G/DL (ref 3.5–5)
ALBUMIN UR-MCNC: NEGATIVE MG/DL
ALP SERPL-CCNC: 251 U/L (ref 45–120)
ALT SERPL W P-5'-P-CCNC: 18 U/L (ref 0–45)
ANION GAP SERPL CALCULATED.3IONS-SCNC: 13 MMOL/L (ref 5–18)
APPEARANCE UR: CLEAR
AST SERPL W P-5'-P-CCNC: 30 U/L (ref 0–40)
BACTERIA UR CULT: NORMAL
BASOPHILS # BLD AUTO: 0 10E3/UL (ref 0–0.2)
BASOPHILS NFR BLD AUTO: 0 %
BILIRUB SERPL-MCNC: 1.2 MG/DL (ref 0–1)
BILIRUB UR QL STRIP: NEGATIVE
BUN SERPL-MCNC: 22 MG/DL (ref 8–28)
CALCIUM SERPL-MCNC: 9.7 MG/DL (ref 8.5–10.5)
CHLORIDE BLD-SCNC: 98 MMOL/L (ref 98–107)
CO2 SERPL-SCNC: 28 MMOL/L (ref 22–31)
COLOR UR AUTO: YELLOW
CREAT SERPL-MCNC: 0.99 MG/DL (ref 0.6–1.1)
EOSINOPHIL # BLD AUTO: 0.1 10E3/UL (ref 0–0.7)
EOSINOPHIL NFR BLD AUTO: 1 %
ERYTHROCYTE [DISTWIDTH] IN BLOOD BY AUTOMATED COUNT: 14.2 % (ref 10–15)
GFR SERPL CREATININE-BSD FRML MDRD: 53 ML/MIN/1.73M2
GLUCOSE BLD-MCNC: 221 MG/DL (ref 70–125)
GLUCOSE UR STRIP-MCNC: NEGATIVE MG/DL
HCT VFR BLD AUTO: 39.5 % (ref 35–47)
HGB BLD-MCNC: 12.1 G/DL (ref 11.7–15.7)
HGB UR QL STRIP: NEGATIVE
HYALINE CASTS: 8 /LPF
IMM GRANULOCYTES # BLD: 0 10E3/UL
IMM GRANULOCYTES NFR BLD: 0 %
INR PPP: 1.79 (ref 0.85–1.15)
INR PPP: 2.05 (ref 0.85–1.15)
INR PPP: 2.29 (ref 0.85–1.15)
INR PPP: 3.25 (ref 0.85–1.15)
KETONES UR STRIP-MCNC: NEGATIVE MG/DL
LEUKOCYTE ESTERASE UR QL STRIP: NEGATIVE
LYMPHOCYTES # BLD AUTO: 1 10E3/UL (ref 0.8–5.3)
LYMPHOCYTES NFR BLD AUTO: 16 %
MCH RBC QN AUTO: 29.7 PG (ref 26.5–33)
MCHC RBC AUTO-ENTMCNC: 30.6 G/DL (ref 31.5–36.5)
MCV RBC AUTO: 97 FL (ref 78–100)
MONOCYTES # BLD AUTO: 0.4 10E3/UL (ref 0–1.3)
MONOCYTES NFR BLD AUTO: 6 %
MUCOUS THREADS #/AREA URNS LPF: PRESENT /LPF
NEUTROPHILS # BLD AUTO: 5.2 10E3/UL (ref 1.6–8.3)
NEUTROPHILS NFR BLD AUTO: 77 %
NITRATE UR QL: NEGATIVE
NRBC # BLD AUTO: 0 10E3/UL
NRBC BLD AUTO-RTO: 0 /100
PH UR STRIP: 6 [PH] (ref 5–7)
PLATELET # BLD AUTO: 213 10E3/UL (ref 150–450)
POTASSIUM BLD-SCNC: 4.1 MMOL/L (ref 3.5–5)
PROT SERPL-MCNC: 6.9 G/DL (ref 6–8)
RBC # BLD AUTO: 4.07 10E6/UL (ref 3.8–5.2)
RBC URINE: <1 /HPF
SODIUM SERPL-SCNC: 139 MMOL/L (ref 136–145)
SP GR UR STRIP: 1.01 (ref 1–1.03)
SQUAMOUS EPITHELIAL: <1 /HPF
UROBILINOGEN UR STRIP-MCNC: <2 MG/DL
WBC # BLD AUTO: 6.7 10E3/UL (ref 4–11)
WBC URINE: 1 /HPF

## 2022-01-01 PROCEDURE — 85610 PROTHROMBIN TIME: CPT | Mod: ORL | Performed by: PHYSICIAN ASSISTANT

## 2022-01-01 PROCEDURE — P9603 ONE-WAY ALLOW PRORATED MILES: HCPCS | Mod: ORL | Performed by: PHYSICIAN ASSISTANT

## 2022-01-01 PROCEDURE — P9603 ONE-WAY ALLOW PRORATED MILES: HCPCS | Mod: ORL

## 2022-01-01 PROCEDURE — 85610 PROTHROMBIN TIME: CPT | Mod: ORL

## 2022-01-01 PROCEDURE — 36415 COLL VENOUS BLD VENIPUNCTURE: CPT | Mod: ORL | Performed by: PHYSICIAN ASSISTANT

## 2022-01-01 PROCEDURE — 87086 URINE CULTURE/COLONY COUNT: CPT | Mod: ORL | Performed by: PHYSICIAN ASSISTANT

## 2022-01-01 PROCEDURE — 81001 URINALYSIS AUTO W/SCOPE: CPT | Mod: ORL | Performed by: PHYSICIAN ASSISTANT

## 2022-01-01 PROCEDURE — 36415 COLL VENOUS BLD VENIPUNCTURE: CPT | Mod: ORL

## 2022-01-01 PROCEDURE — 85025 COMPLETE CBC W/AUTO DIFF WBC: CPT | Mod: ORL | Performed by: PHYSICIAN ASSISTANT

## 2022-01-01 PROCEDURE — 80053 COMPREHEN METABOLIC PANEL: CPT | Mod: ORL | Performed by: PHYSICIAN ASSISTANT
